# Patient Record
Sex: FEMALE | Race: WHITE | NOT HISPANIC OR LATINO | Employment: FULL TIME | URBAN - METROPOLITAN AREA
[De-identification: names, ages, dates, MRNs, and addresses within clinical notes are randomized per-mention and may not be internally consistent; named-entity substitution may affect disease eponyms.]

---

## 2017-01-02 PROBLEM — F32.0 MAJOR DEPRESSIVE DISORDER, SINGLE EPISODE, MILD (H): Status: ACTIVE | Noted: 2017-01-02

## 2017-02-28 ENCOUNTER — HOSPITAL ENCOUNTER (OUTPATIENT)
Dept: MAMMOGRAPHY | Facility: CLINIC | Age: 57
Discharge: HOME | End: 2017-02-28
Attending: INTERNAL MEDICINE | Admitting: INTERNAL MEDICINE
Payer: COMMERCIAL

## 2017-02-28 DIAGNOSIS — Z12.31 VISIT FOR SCREENING MAMMOGRAM: ICD-10-CM

## 2017-02-28 PROCEDURE — 77063 BREAST TOMOSYNTHESIS BI: CPT

## 2017-03-07 ENCOUNTER — MYC MEDICAL ADVICE (OUTPATIENT)
Dept: FAMILY MEDICINE | Facility: CLINIC | Age: 57
End: 2017-03-07

## 2017-03-07 NOTE — TELEPHONE ENCOUNTER
LM per christianhart and on patient mobile to call clinic for appt. Possibility to schedule for tomorrow or Thursday with pcp. Advised to ask for triage.    Lamar Herring RN

## 2017-03-09 ENCOUNTER — OFFICE VISIT (OUTPATIENT)
Dept: FAMILY MEDICINE | Facility: CLINIC | Age: 57
End: 2017-03-09
Payer: COMMERCIAL

## 2017-03-09 VITALS
SYSTOLIC BLOOD PRESSURE: 106 MMHG | HEIGHT: 67 IN | TEMPERATURE: 98 F | RESPIRATION RATE: 16 BRPM | WEIGHT: 194.2 LBS | BODY MASS INDEX: 30.48 KG/M2 | HEART RATE: 71 BPM | OXYGEN SATURATION: 97 % | DIASTOLIC BLOOD PRESSURE: 60 MMHG

## 2017-03-09 DIAGNOSIS — I45.6 ANOMALOUS ATRIOVENTRICULAR EXCITATION: ICD-10-CM

## 2017-03-09 DIAGNOSIS — F43.0 ACUTE REACTION TO STRESS: ICD-10-CM

## 2017-03-09 DIAGNOSIS — F33.1 MODERATE EPISODE OF RECURRENT MAJOR DEPRESSIVE DISORDER (H): Primary | ICD-10-CM

## 2017-03-09 PROCEDURE — 99214 OFFICE O/P EST MOD 30 MIN: CPT | Performed by: INTERNAL MEDICINE

## 2017-03-09 RX ORDER — CITALOPRAM HYDROBROMIDE 10 MG/1
10 TABLET ORAL DAILY
Qty: 30 TABLET | Refills: 3 | Status: SHIPPED | OUTPATIENT
Start: 2017-03-09 | End: 2018-05-10

## 2017-03-09 NOTE — NURSING NOTE
"Chief Complaint   Patient presents with     Depression       Initial /60 (BP Location: Right arm, Patient Position: Chair, Cuff Size: Adult Large)  Pulse 71  Temp 98  F (36.7  C) (Oral)  Resp 16  Ht 5' 7\" (1.702 m)  Wt 194 lb 3.2 oz (88.1 kg)  LMP 03/13/2013  SpO2 97%  BMI 30.42 kg/m2 Estimated body mass index is 30.42 kg/(m^2) as calculated from the following:    Height as of this encounter: 5' 7\" (1.702 m).    Weight as of this encounter: 194 lb 3.2 oz (88.1 kg).  Medication Reconciliation: complete   Sophie Mckeon MA      "

## 2017-03-09 NOTE — MR AVS SNAPSHOT
After Visit Summary   3/9/2017    Ade Crowley    MRN: [id number]           Patient Information     Date Of Birth          1960        Visit Information        Provider Department      3/9/2017 3:00 PM Verona Johnson MD [facility]        Today's Diagnoses     Acute reaction to stress    -  1       Follow-ups after your visit        Follow-up notes from your care team     Return in about 2 months (around 5/9/2017).      Your next 10 appointments already scheduled     Mar 22, 2017  3:30 PM CDT   SHORT with Verona Johnson MD   [facility]    [address] [address] [phone]              Who to contact     If you have questions or need follow up information about today's clinic visit or your schedule please contact [facility] directly at [phone].  Normal or non-critical lab and imaging results will be communicated to you by MyChart, letter or phone within 4 business days after the clinic has received the results. If you do not hear from us within 7 days, please contact the clinic through [email or web address] or phone. If you have a critical or abnormal lab result, we will notify you by phone as soon as possible.  Submit refill requests through [employer] or call your pharmacy and they will forward the refill request to us. Please allow 3 business days for your refill to be completed.          Additional Information About Your Visit        MyChart Information     [employer] gives you secure access to your electronic health record. If you see a primary care provider, you can also send messages to your care team and make appointments. If you have questions, please call your primary care clinic.  If you do not have a primary care provider, please call [phone] and they will assist you.        Care EveryWhere ID     This is your Care EveryWhere ID. This could be used by other organizations to  "access your [place] medical records  [id number]        Your Vitals Were     Pulse Temperature Respirations Height Last Period Pulse Oximetry    71 98  F (36.7  C) (Oral) 16 5' 7\" (1.702 m) 03/13/2013 97%    BMI (Body Mass Index)                   30.42 kg/m2            Blood Pressure from Last 3 Encounters:   03/09/17 106/60   12/21/16 104/62   07/27/16 112/70    Weight from Last 3 Encounters:   03/09/17 194 lb 3.2 oz (88.1 kg)   12/21/16 194 lb (88 kg)   07/27/16 192 lb 8 oz (87.3 kg)              Today, you had the following     No orders found for display         Today's Medication Changes          These changes are accurate as of: 3/9/17  3:24 PM.  If you have any questions, ask your nurse or doctor.               Start taking these medicines.        Dose/Directions    citalopram 10 MG tablet   Commonly known as:  celeXA   Used for:  Acute reaction to stress   Started by:  Verona Johnson MD        Dose:  10 mg   Take 1 tablet (10 mg) by mouth daily   Quantity:  30 tablet   Refills:  3            Where to get your medicines      These medications were sent to [facility] [address] [place], [place] - [email or web address]  [address] [facility] [address]     Phone:  [phone]     citalopram 10 MG tablet                Primary Care Provider Office Phone # Fax #    Verona Johnson [phone]       [facility] [address]  [facility] access hospital [address]        Thank you!     Thank you for choosing [facility]  for your care. Our goal is always to provide you with excellent care. Hearing back from our patients is one way we can continue to improve our services. Please take a few minutes to complete the written survey that you may receive in the mail after your visit with us. Thank you!             Your Updated Medication List - Protect others around you: Learn how to safely use, store and throw away your medicines at www.disposemymeds.org.          This " list is accurate as of: 3/9/17  3:24 PM.  Always use your most recent med list.                   Brand Name Dispense Instructions for use    buPROPion 300 MG 24 hr tablet    WELLBUTRIN XL    90 tablet    Take 1 tablet (300 mg) by mouth every morning       citalopram 10 MG tablet    celeXA    30 tablet    Take 1 tablet (10 mg) by mouth daily

## 2017-03-09 NOTE — PROGRESS NOTES
"  SUBJECTIVE:                                                    Ade Crowley is a 56 year old female who presents to clinic today for the following health issues:    Depression Follow-up      Status since last visit: Worsened, pt is feeling very tense, and anxious    See PHQ-9 for current symptoms. Other associated symptoms: None    Complicating factors:   Significant life event: No   Current substance abuse: None  Anxiety or Panic symptoms: Pt has been feeling a lot more anxious lately.   PHQ-9  English PHQ-9   Any Language          Amount of exercise or physical activity: Daily activities, including work     Problems taking medications regularly: No    Medication side effects: none, but pt states that she may need a dose adjustment.     Diet: regular (no restrictions)    Problem list and histories reviewed & adjusted, as indicated.  Additional history: as documented    BP Readings from Last 3 Encounters:   03/09/17 106/60   12/21/16 104/62   07/27/16 112/70    Wt Readings from Last 3 Encounters:   03/09/17 194 lb 3.2 oz (88.1 kg)   12/21/16 194 lb (88 kg)   07/27/16 192 lb 8 oz (87.3 kg)         Reviewed and updated as needed this visit by clinical staff  Tobacco  Allergies  Meds  Problems  Med Hx  Surg Hx  Fam Hx  Soc Hx      Reviewed and updated as needed this visit by Provider  Allergies  Meds  Problems       ROS:  CONSTITUTIONAL: NEGATIVE for fever, chills, change in weight  RESP: NEGATIVE for significant cough or SOB  CV: hx of sheila Parkinson White arrhythmia; NEGATIVE for chest pain, palpitations or peripheral edema  GI: NEGATIVE for nausea, abdominal pain, heartburn, or change in bowel habits  MUSCULOSKELETAL: NEGATIVE for significant arthralgias or myalgia  : no urinary symptoms;   GYN: no hot flashes  NEURO: NEGATIVE for weakness, dizziness or paresthesias  PSYCHIATRIC: Hx of depression - use of Wellbutrin, POSITIVE for increased anxiety - feeling \"tense\" (tense muscles, headaches), " "difficulties handling stress (family, recent \"empty cliff,\" daughter with health problems), difficulties sleeping    This document serves as a record of the services and decisions personally performed and made by Verona Johnson MD. It was created on her behalf by Yadira Parra, a trained medical scribe. The creation of this document is based on the provider's statements to the medical scribe.   Yadira Parra, 3:12 PM, March 9, 2017    OBJECTIVE:                                                    /60 (BP Location: Right arm, Patient Position: Chair, Cuff Size: Adult Large)  Pulse 71  Temp 98  F (36.7  C) (Oral)  Resp 16  Ht 5' 7\" (1.702 m)  Wt 194 lb 3.2 oz (88.1 kg)  LMP 03/13/2013  SpO2 97%  BMI 30.42 kg/m2  Body mass index is 30.42 kg/(m^2).  GENERAL APPEARANCE: healthy, alert and no distress  NECK: no bruits  RESP: lungs clear to auscultation - no rales, rhonchi or wheezes  CV: regular rates and rhythm and normal S1 S2, no peripheral edema  MS: extremities normal- no gross deformities noted  PSYCH: mentation appears normal and affect normal/bright    Diagnostic Test Results:  none      ASSESSMENT/PLAN:                                                    (F43.0) Acute reaction to stress (primary encounter diagnosis)  Comment: increased anxiety and family stressors, difficulties sleeping; reviewed addition of SSRI - continue Wellbutrin, add Citalopram and add counseling; [facility] information provided; reassess in 6-8 weeks   Plan: citalopram (CELEXA) 10 MG tablet; continue Wellbutrin, no change in med/dose    (F33.1) Moderate episode of recurrent major depressive disorder (H)  (primary encounter diagnosis)  Comment: add Citalopram to Wellbutrin; consider counseling; information provided.  Plan: counseling options; meds reviewed; as listed          (I45.6) Pt reports Del Cid Parkinson White  Comment: past arrythmia;   Plan: no active     MEDICATIONS:   Orders Placed This Encounter   Medications     " citalopram (CELEXA) 10 MG tablet     Sig: Take 1 tablet (10 mg) by mouth daily     Dispense:  30 tablet     Refill:  3     There are no discontinued medications.     FUTURE APPOINTMENTS:       - Follow-up visit in 6-8 weeks, reassess meds, counseling, etc.    Verona Johnson MD  Internal Medicine  [facility]    The information in this document, created by a medical scribe for me, accurately reflects the services I personally performed and the decisions made by me. I have reviewed and approved this document for accuracy.  Dr. Verona Johnson, 3:25 PM, March 9, 2017

## 2017-03-15 ASSESSMENT — ANXIETY QUESTIONNAIRES
3. WORRYING TOO MUCH ABOUT DIFFERENT THINGS: NEARLY EVERY DAY
1. FEELING NERVOUS, ANXIOUS, OR ON EDGE: NEARLY EVERY DAY
7. FEELING AFRAID AS IF SOMETHING AWFUL MIGHT HAPPEN: NEARLY EVERY DAY
5. BEING SO RESTLESS THAT IT IS HARD TO SIT STILL: MORE THAN HALF THE DAYS
6. BECOMING EASILY ANNOYED OR IRRITABLE: NEARLY EVERY DAY
GAD7 TOTAL SCORE: 20
2. NOT BEING ABLE TO STOP OR CONTROL WORRYING: NEARLY EVERY DAY
IF YOU CHECKED OFF ANY PROBLEMS ON THIS QUESTIONNAIRE, HOW DIFFICULT HAVE THESE PROBLEMS MADE IT FOR YOU TO DO YOUR WORK, TAKE CARE OF THINGS AT HOME, OR GET ALONG WITH OTHER PEOPLE: VERY DIFFICULT

## 2017-03-15 ASSESSMENT — PATIENT HEALTH QUESTIONNAIRE - PHQ9: 5. POOR APPETITE OR OVEREATING: NEARLY EVERY DAY

## 2017-03-16 ENCOUNTER — TRANSFERRED RECORDS (OUTPATIENT)
Dept: HEALTH INFORMATION MANAGEMENT | Facility: CLINIC | Age: 57
End: 2017-03-16

## 2017-03-16 ASSESSMENT — PATIENT HEALTH QUESTIONNAIRE - PHQ9: SUM OF ALL RESPONSES TO PHQ QUESTIONS 1-9: 20

## 2017-03-16 ASSESSMENT — ANXIETY QUESTIONNAIRES: GAD7 TOTAL SCORE: 20

## 2017-06-30 ENCOUNTER — OFFICE VISIT (OUTPATIENT)
Dept: CARDIOLOGY | Facility: CLINIC | Age: 57
End: 2017-06-30
Payer: COMMERCIAL

## 2017-06-30 VITALS
HEIGHT: 67 IN | SYSTOLIC BLOOD PRESSURE: 122 MMHG | WEIGHT: 194 LBS | HEART RATE: 66 BPM | DIASTOLIC BLOOD PRESSURE: 80 MMHG | BODY MASS INDEX: 30.45 KG/M2

## 2017-06-30 DIAGNOSIS — I47.10 SVT (SUPRAVENTRICULAR TACHYCARDIA) (H): Primary | ICD-10-CM

## 2017-06-30 PROCEDURE — 99213 OFFICE O/P EST LOW 20 MIN: CPT | Mod: 25 | Performed by: INTERNAL MEDICINE

## 2017-06-30 PROCEDURE — 93000 ELECTROCARDIOGRAM COMPLETE: CPT | Performed by: INTERNAL MEDICINE

## 2017-06-30 NOTE — PROGRESS NOTES
"Electrophysiology/ Clinic Note         H&P and Plan:     Ms. Crowley is a delightful 56-year-old lady with a long history of WPW syndrome and recurrent episodes of palpitations who was admitted in the hospital with an episode of syncope in 2015 and underwent EP study and accessory pathway ablation in 12/2015.  She is here today for evaluation of palpitations.         She underwent EP study in 2015, and we found 2 accessory pathways.  One located at the posterolateral wall at the LV and another one in posteroseptal/inferior wall of the LV.  The first pathway had both antegrade and retrograde conduction, while the second one only had retrograde conduction.  The pathway with antegrade conduction was able to conduct antegrade up to 220 milliseconds.  She underwent successful ablation.     In 04/2016 she presented with palpitation associated with frequent PVCs (PVCs had positive QRS morphology in the inferior leads).   Monitor confirmed a 4% PVC burden and she decided to continue conservative approach.    At the moment, she is doing well and rarely has any sensation of PVCs. She denies any symptoms such as chest pain, shortness of breath, palpitation, lightheadedness, near syncope or syncope.     ECG today shows NSR, no sings of preexcitation or PVC      ASSESSMENT AND PLAN:    1. palpitation related to PVCs. Resolved.  She is not on any medication.  She will f/u with us on PRN basis.  2.  Paroxysmal SVT/WPW syndrome.  She responded well to ablation.  EKG continues to show no signs of preexcitation.    Physical Exam:  Vitals: /80  Ht 1.702 m (5' 7\")  Wt 88 kg (194 lb)  LMP 03/13/2013  BMI 30.38 kg/m2    Constitutional:  AAO x3.  Pt is in NAD.  HEAD: normocephalic.  SKIN: Skin normal color, texture and turgor with no lesions or eruptions.  Eyes: PERRL, EOMI.  ENT:  Supple, normal JVP. No lymphadenopathy or thyroid enlargement.  Chest:  CTAB.  Cardiac: RRR, normal  S1 and S2.  No murmurs rubs or gallop.   Abdomen: "  Normal BS.  Soft, non-tender and non-distended.  No rebound or guarding.    Extremities:  Pedious pulses palpable B/L.  No LE edema noticed.   Neurological: Strength and sensation grossly symmetric and intact throughout.       CURRENT MEDICATIONS:  Current Outpatient Prescriptions   Medication Sig Dispense Refill     buPROPion (WELLBUTRIN XL) 300 MG 24 hr tablet Take 1 tablet (300 mg) by mouth every morning 90 tablet 3     citalopram (CELEXA) 10 MG tablet Take 1 tablet (10 mg) by mouth daily (Patient not taking: Reported on 2017) 30 tablet 3       ALLERGIES     Allergies   Allergen Reactions     No Known Drug Allergies        PAST MEDICAL HISTORY:  Past Medical History:   Diagnosis Date     Anesthesia complication     pt states with one of her C-sections the epidural didn't work and she needed general anesthesia     Depression      Infrequent menses     & heavy menses     SVT (supraventricular tachycardia) (H)     S/p ablation 12/10/15     WPW (Jaret-Parkinson-White syndrome) 1997       PAST SURGICAL HISTORY:  Past Surgical History:   Procedure Laterality Date     APPENDECTOMY       C NONSPECIFIC PROCEDURE      Tubal ligation     C NONSPECIFIC PROCEDURE      C-sections x 3     C NONSPECIFIC PROCEDURE      left breast biopsy--benign      SECTION  x3     COLONOSCOPY N/A 2014    Procedure: COMBINED COLONOSCOPY, SINGLE OR MULTIPLE BIOPSY/POLYPECTOMY BY BIOPSY;  Surgeon: Jesus Adler MD;  Location:  GI     DAVINCI HYSTERECTOMY SUPRACERVICAL  2013    Procedure: DAVINCI HYSTERECTOMY SUPRACERVICAL;  Frozen section dilation & curretage. Robotic Assisted  Laparoscopic Supracervical Hysterectomy, Bilateral Salpingectomy, Cystoscopy ;  Surgeon: Sophia Jiménez DO;  Location: [facility] OR     DILATION AND CURETTAGE  2013    Procedure: DILATION AND CURETTAGE;;  Surgeon: Sophia Jiménez DO;  Location: [facility] OR     H ABLATION SVT  12/10/2015    WPW     HC REMOVAL OF TONSILS,12+ Y/O          FAMILY HISTORY:  Family History   Problem Relation Age of Onset     Arthritis Mother      CANCER Mother      breast cancer stage 4 original [id number] recurred 2 years ago/skin cancer bcc     Gynecology Mother      uterine tumor with hysterectomy     Thyroid Disease Mother      hypothyroid     Breast Cancer Mother      Cardiovascular Father      chf  of heart attack age 68     DIABETES Daughter      type one diabetes   diagnosed age 9     CANCER Maternal Grandmother      bone cancer       SOCIAL HISTORY:  Social History     Social History     Marital status:      Spouse name: N/A     Number of children: N/A     Years of education: N/A     Social History Main Topics     Smoking status: Former Smoker     Years: 20.00     Types: Cigarettes     Quit date: 2007     Smokeless tobacco: Never Used      Comment: 10/3/2015     Alcohol use 0.0 oz/week     0 Standard drinks or equivalent per week      Comment: occasional beer/wine, few drinks every other weekend     Drug use: No     Sexual activity: Yes     Partners: Male     Birth control/ protection: Surgical      Comment: tubal ligation     Other Topics Concern     Parent/Sibling W/ Cabg, Mi Or Angioplasty Before 65f 55m? No     Caffeine Concern Yes     4-5 cups coffee per day     Sleep Concern No     Weight Concern Yes     trying to lose weight     Special Diet Yes     weight watchers     Exercise Yes     gym 3 days week     Social History Narrative       Review of Systems:  Skin:  Negative     Eyes:  Positive for glasses  ENT:  Negative    Respiratory:  Negative    Cardiovascular:  Negative edema;Positive for  Gastroenterology: Negative    Genitourinary:  Negative    Musculoskeletal:  Positive for nocturnal cramping  Neurologic:  Negative    Psychiatric:  Negative anxiety  Heme/Lymph/Imm:  Negative    Endocrine:  Negative        Recent Lab Results:  LIPID RESULTS:  Lab Results   Component Value Date    CHOL 175 2016    HDL 42 (L)  08/19/2016     (H) 08/19/2016    TRIG 130 08/19/2016    CHOLHDLRATIO 3.4 06/03/2015       LIVER ENZYME RESULTS:  Lab Results   Component Value Date    AST 11 08/19/2016    ALT 19 08/19/2016       CBC RESULTS:  Lab Results   Component Value Date    WBC 4.8 08/19/2016    RBC 4.37 08/19/2016    HGB 13.4 08/19/2016    HCT 40.4 08/19/2016    MCV 92 08/19/2016    MCH 30.7 08/19/2016    MCHC 33.2 08/19/2016    RDW 13.0 08/19/2016     08/19/2016       BMP RESULTS:  Lab Results   Component Value Date     08/19/2016    POTASSIUM 4.0 08/19/2016    CHLORIDE 108 08/19/2016    CO2 27 08/19/2016    ANIONGAP 5 08/19/2016    GLC 99 08/19/2016    BUN 12 08/19/2016    CR 0.78 08/19/2016    GFRESTIMATED 77 08/19/2016    GFRESTBLACK >90   GFR Calc   08/19/2016    MILAN 8.9 08/19/2016        A1C RESULTS:  No results found for: A1C    INR RESULTS:  Lab Results   Component Value Date    INR 0.90 12/10/2015         ECHOCARDIOGRAM  No results found for this or any previous visit (from the past 8760 hour(s)).      Orders Placed This Encounter   Procedures     EKG 12-lead complete w/read - Clinics (performed today)     No orders of the defined types were placed in this encounter.    There are no discontinued medications.      Encounter Diagnosis   Name Primary?     SVT (supraventricular tachycardia) (H) Yes         CC  No referring provider defined for this encounter.

## 2017-06-30 NOTE — MR AVS SNAPSHOT
"              After Visit Summary   6/30/2017    Ade Crowley    MRN: [id number]           Patient Information     Date Of Birth          1960        Visit Information        Provider Department      6/30/2017 1:15 PM Alex Smith MD [facility] [place]        Today's Diagnoses     SVT (supraventricular tachycardia) (H)    -  1       Follow-ups after your visit        Who to contact     If you have questions or need follow up information about today's clinic visit or your schedule please contact [facility] directly at [phone].  Normal or non-critical lab and imaging results will be communicated to you by iMegahart, letter or phone within 4 business days after the clinic has received the results. If you do not hear from us within 7 days, please contact the clinic through [employer] or phone. If you have a critical or abnormal lab result, we will notify you by phone as soon as possible.  Submit refill requests through [employer] or call your pharmacy and they will forward the refill request to us. Please allow 3 business days for your refill to be completed.          Additional Information About Your Visit        MyChart Information     [employer] gives you secure access to your electronic health record. If you see a primary care provider, you can also send messages to your care team and make appointments. If you have questions, please call your primary care clinic.  If you do not have a primary care provider, please call [phone] and they will assist you.        Care EveryWhere ID     This is your Care EveryWhere ID. This could be used by other organizations to access your [place] medical records  [id number]        Your Vitals Were     Pulse Height Last Period BMI (Body Mass Index)          66 1.702 m (5' 7\") 03/13/2013 30.38 kg/m2         Blood Pressure from Last 3 Encounters:   06/30/17 122/80   03/09/17 106/60   12/21/16 " 104/62    Weight from Last 3 Encounters:   06/30/17 88 kg (194 lb)   03/09/17 88.1 kg (194 lb 3.2 oz)   12/21/16 88 kg (194 lb)              We Performed the Following     EKG 12-lead complete w/read - Clinics (performed today)        Primary Care Provider Office Phone # Fax #    Verona Raghu Johnson [phone]       [facility] [address] [facility] [address]        Equal Access to Services     EDITA ALVARADO : Hadii aad ku hadasho Soomaali, waaxda luqadaha, qaybta kaalmada adeegyada, waxay idiin hayaan adeeg kharash bisi . So [facility] [phone].    ATENCIÓN: Si habla español, tiene a orozco disposición servicios gratuitos de asistencia lingüística. Llame al [phone].    We comply with applicable federal civil rights laws and [place] laws. We do not discriminate on the basis of race, color, national origin, age, disability sex, sexual orientation or gender identity.            Thank you!     Thank you for choosing [facility] [place]  for your care. Our goal is always to provide you with excellent care. Hearing back from our patients is one way we can continue to improve our services. Please take a few minutes to complete the written survey that you may receive in the mail after your visit with us. Thank you!             Your Updated Medication List - Protect others around you: Learn how to safely use, store and throw away your medicines at www.disposemymeds.org.          This list is accurate as of: 6/30/17  1:36 PM.  Always use your most recent med list.                   Brand Name Dispense Instructions for use Diagnosis    buPROPion 300 MG 24 hr tablet    WELLBUTRIN XL    90 tablet    Take 1 tablet (300 mg) by mouth every morning    Major depressive disorder, single episode, mild (H)       citalopram 10 MG tablet    celeXA    30 tablet    Take 1 tablet (10 mg) by mouth daily    Acute reaction to stress

## 2017-06-30 NOTE — LETTER
"6/30/2017    Verona Johnson MD  [facility]   [address]  [facility] [address]    RE: Ade GEORGE Carline       Dear Colleague,    I had the pleasure of seeing Ade GEORGE Mademar in the [facility].    Electrophysiology/ Clinic Note         H&P and Plan:     Ms. Crowley is a delightful 56-year-old lady with a long history of WPW syndrome and recurrent episodes of palpitations who was admitted in the hospital with an episode of syncope in 2015 and underwent EP study and accessory pathway ablation in 12/2015.  She is here today for evaluation of palpitations.         She underwent EP study in 2015, and we found 2 accessory pathways.  One located at the posterolateral wall at the LV and another one in posteroseptal/inferior wall of the LV.  The first pathway had both antegrade and retrograde conduction, while the second one only had retrograde conduction.  The pathway with antegrade conduction was able to conduct antegrade up to 220 milliseconds.  She underwent successful ablation.     In 04/2016 she presented with palpitation associated with frequent PVCs (PVCs had positive QRS morphology in the inferior leads).   Monitor confirmed a 4% PVC burden and she decided to continue conservative approach.    At the moment, she is doing well and rarely has any sensation of PVCs. She denies any symptoms such as chest pain, shortness of breath, palpitation, lightheadedness, near syncope or syncope.     ECG today shows NSR, no sings of preexcitation or PVC      ASSESSMENT AND PLAN:    1. palpitation related to PVCs. Resolved.  She is not on any medication.  She will f/u with us on PRN basis.  2.  Paroxysmal SVT/WPW syndrome.  She responded well to ablation.  EKG continues to show no signs of preexcitation.    Physical Exam:  Vitals: /80  Ht 1.702 m (5' 7\")  Wt 88 kg (194 lb)  LMP 03/13/2013  BMI 30.38 kg/m2    Constitutional:  AAO x3.  Pt is in NAD.  HEAD: " normocephalic.  SKIN: Skin normal color, texture and turgor with no lesions or eruptions.  Eyes: PERRL, EOMI.  ENT:  Supple, normal JVP. No lymphadenopathy or thyroid enlargement.  Chest:  CTAB.  Cardiac: RRR, normal  S1 and S2.  No murmurs rubs or gallop.   Abdomen:  Normal BS.  Soft, non-tender and non-distended.  No rebound or guarding.    Extremities:  Pedious pulses palpable B/L.  No LE edema noticed.   Neurological: Strength and sensation grossly symmetric and intact throughout.       CURRENT MEDICATIONS:  Current Outpatient Prescriptions   Medication Sig Dispense Refill     buPROPion (WELLBUTRIN XL) 300 MG 24 hr tablet Take 1 tablet (300 mg) by mouth every morning 90 tablet 3     citalopram (CELEXA) 10 MG tablet Take 1 tablet (10 mg) by mouth daily (Patient not taking: Reported on 2017) 30 tablet 3       ALLERGIES     Allergies   Allergen Reactions     No Known Drug Allergies        PAST MEDICAL HISTORY:  Past Medical History:   Diagnosis Date     Anesthesia complication     pt states with one of her C-sections the epidural didn't work and she needed general anesthesia     Depression      Infrequent menses     & heavy menses     SVT (supraventricular tachycardia) (H)     S/p ablation 12/10/15     WPW (Jaret-Parkinson-White syndrome) 1997       PAST SURGICAL HISTORY:  Past Surgical History:   Procedure Laterality Date     APPENDECTOMY       C NONSPECIFIC PROCEDURE      Tubal ligation     C NONSPECIFIC PROCEDURE      C-sections x 3     C NONSPECIFIC PROCEDURE      left breast biopsy--benign      SECTION  x3     COLONOSCOPY N/A 2014    Procedure: COMBINED COLONOSCOPY, SINGLE OR MULTIPLE BIOPSY/POLYPECTOMY BY BIOPSY;  Surgeon: Jesus Adler MD;  Location:  GI     DAVINCI HYSTERECTOMY SUPRACERVICAL  2013    Procedure: DAVINCI HYSTERECTOMY SUPRACERVICAL;  Frozen section dilation & curretage. Robotic Assisted  Laparoscopic Supracervical Hysterectomy, Bilateral Salpingectomy,  Cystoscopy ;  Surgeon: Sophia Jiménez DO;  Location: [facility] OR     DILATION AND CURETTAGE  2013    Procedure: DILATION AND CURETTAGE;;  Surgeon: Sophia Jiménez DO;  Location: [facility] OR     H ABLATION SVT  12/10/2015    WPW     HC REMOVAL OF TONSILS,12+ Y/O         FAMILY HISTORY:  Family History   Problem Relation Age of Onset     Arthritis Mother      CANCER Mother      breast cancer stage 4 original [id number] recurred 2 years ago/skin cancer bcc     Gynecology Mother      uterine tumor with hysterectomy     Thyroid Disease Mother      hypothyroid     Breast Cancer Mother      Cardiovascular Father      chf  of heart attack age 68     DIABETES Daughter      type one diabetes   diagnosed age 9     CANCER Maternal Grandmother      bone cancer       SOCIAL HISTORY:  Social History     Social History     Marital status:      Spouse name: N/A     Number of children: N/A     Years of education: N/A     Social History Main Topics     Smoking status: Former Smoker     Years: 20.00     Types: Cigarettes     Quit date: 2007     Smokeless tobacco: Never Used      Comment: 10/3/2015     Alcohol use 0.0 oz/week     0 Standard drinks or equivalent per week      Comment: occasional beer/wine, few drinks every other weekend     Drug use: No     Sexual activity: Yes     Partners: Male     Birth control/ protection: Surgical      Comment: tubal ligation     Other Topics Concern     Parent/Sibling W/ Cabg, Mi Or Angioplasty Before 65f 55m? No     Caffeine Concern Yes     4-5 cups coffee per day     Sleep Concern No     Weight Concern Yes     trying to lose weight     Special Diet Yes     weight watchers     Exercise Yes     gym 3 days week     Social History Narrative       Review of Systems:  Skin:  Negative     Eyes:  Positive for glasses  ENT:  Negative    Respiratory:  Negative    Cardiovascular:  Negative edema;Positive for  Gastroenterology: Negative    Genitourinary:  Negative     Musculoskeletal:  Positive for nocturnal cramping  Neurologic:  Negative    Psychiatric:  Negative anxiety  Heme/Lymph/Imm:  Negative    Endocrine:  Negative        Recent Lab Results:  LIPID RESULTS:  Lab Results   Component Value Date    CHOL 175 08/19/2016    HDL 42 (L) 08/19/2016     (H) 08/19/2016    TRIG 130 08/19/2016    CHOLHDLRATIO 3.4 06/03/2015       LIVER ENZYME RESULTS:  Lab Results   Component Value Date    AST 11 08/19/2016    ALT 19 08/19/2016       CBC RESULTS:  Lab Results   Component Value Date    WBC 4.8 08/19/2016    RBC 4.37 08/19/2016    HGB 13.4 08/19/2016    HCT 40.4 08/19/2016    MCV 92 08/19/2016    MCH 30.7 08/19/2016    MCHC 33.2 08/19/2016    RDW 13.0 08/19/2016     08/19/2016       BMP RESULTS:  Lab Results   Component Value Date     08/19/2016    POTASSIUM 4.0 08/19/2016    CHLORIDE 108 08/19/2016    CO2 27 08/19/2016    ANIONGAP 5 08/19/2016    GLC 99 08/19/2016    BUN 12 08/19/2016    CR 0.78 08/19/2016    GFRESTIMATED 77 08/19/2016    GFRESTBLACK >90   GFR Calc   08/19/2016    MILAN 8.9 08/19/2016        A1C RESULTS:  No results found for: A1C    INR RESULTS:  Lab Results   Component Value Date    INR 0.90 12/10/2015         ECHOCARDIOGRAM  No results found for this or any previous visit (from the past 8760 hour(s)).      Orders Placed This Encounter   Procedures     EKG 12-lead complete w/read - Clinics (performed today)     No orders of the defined types were placed in this encounter.    There are no discontinued medications.      Encounter Diagnosis   Name Primary?     SVT (supraventricular tachycardia) (H) Yes     Thank you for allowing me to participate in the care of your patient.    Sincerely,     Alex Smith MD     [facility]

## 2017-11-18 DIAGNOSIS — F32.0 MAJOR DEPRESSIVE DISORDER, SINGLE EPISODE, MILD (H): ICD-10-CM

## 2017-11-22 ASSESSMENT — PATIENT HEALTH QUESTIONNAIRE - PHQ9
SUM OF ALL RESPONSES TO PHQ QUESTIONS 1-9: 8
5. POOR APPETITE OR OVEREATING: NOT AT ALL

## 2017-11-22 ASSESSMENT — ANXIETY QUESTIONNAIRES
IF YOU CHECKED OFF ANY PROBLEMS ON THIS QUESTIONNAIRE, HOW DIFFICULT HAVE THESE PROBLEMS MADE IT FOR YOU TO DO YOUR WORK, TAKE CARE OF THINGS AT HOME, OR GET ALONG WITH OTHER PEOPLE: NOT DIFFICULT AT ALL
2. NOT BEING ABLE TO STOP OR CONTROL WORRYING: SEVERAL DAYS
6. BECOMING EASILY ANNOYED OR IRRITABLE: SEVERAL DAYS
3. WORRYING TOO MUCH ABOUT DIFFERENT THINGS: NOT AT ALL
5. BEING SO RESTLESS THAT IT IS HARD TO SIT STILL: NOT AT ALL
7. FEELING AFRAID AS IF SOMETHING AWFUL MIGHT HAPPEN: SEVERAL DAYS
1. FEELING NERVOUS, ANXIOUS, OR ON EDGE: SEVERAL DAYS
GAD7 TOTAL SCORE: 4

## 2017-11-22 NOTE — TELEPHONE ENCOUNTER
Requested Prescriptions   Pending Prescriptions Disp Refills     buPROPion (WELLBUTRIN XL) 300 MG 24 hr tablet 90 tablet 3     Sig: Take 1 tablet (300 mg) by mouth every morning    SSRIs Protocol Failed    11/20/2017  2:32 PM       Failed - PHQ-9 score less than 5 in past 6 months    Please review PHQ-9 score.          Failed - Medication is NOT Bupropion    If the medication is Bupropion (Wellbutrin), and the patient is taking for smoking cessation; OK to refill.         Failed - Recent (6 mo) or future visit with authorizing provider's specialty    Patient had office visit in the last 6 months or has a visit in the next 30 days with authorizing provider.  See chart review.          Passed - Patient is age 18 or older       Passed - No active pregnancy on record       Passed - No positive pregnancy test in last 12 months        Scores are updated today, please review scores.  She has medications, so will not need today.  PHQ 9 is >4. Please sign if ok.  Snow Durand RN  Triage Nurse

## 2017-11-23 ASSESSMENT — ANXIETY QUESTIONNAIRES: GAD7 TOTAL SCORE: 4

## 2017-11-28 RX ORDER — BUPROPION HYDROCHLORIDE 300 MG/1
300 TABLET ORAL EVERY MORNING
Qty: 90 TABLET | Refills: 0 | Status: SHIPPED | OUTPATIENT
Start: 2017-11-28 | End: 2018-04-24

## 2017-11-28 NOTE — TELEPHONE ENCOUNTER
PAMELA-7 SCORE 4/21/2016 3/15/2017 11/22/2017   Total Score - - -   Total Score - - -   Total Score 3 20 4       PHQ-9 SCORE 11/24/2016 3/15/2017 11/22/2017   Total Score - - -   Total Score MyChart 3 (Minimal depression) - -   Total Score - 20 8     Due for follow up to assess meds and additional treatment options.will provide limited supply

## 2018-04-24 ENCOUNTER — TELEPHONE (OUTPATIENT)
Dept: FAMILY MEDICINE | Facility: CLINIC | Age: 58
End: 2018-04-24

## 2018-04-24 DIAGNOSIS — F32.0 MAJOR DEPRESSIVE DISORDER, SINGLE EPISODE, MILD (H): ICD-10-CM

## 2018-04-25 NOTE — TELEPHONE ENCOUNTER
"Requested Prescriptions   Pending Prescriptions Disp Refills     buPROPion (WELLBUTRIN XL) 300 MG 24 hr tablet [Pharmacy Med Name: BUPROPION XL 300MG TAB]    Last Written Prescription Date:  11/28/2017  Last Fill Quantity: 90,  # refills: 0   Last office visit: 3/9/2017 with prescribing provider:  Verona Johnson     Future Office Visit:     90 tablet 0     Sig: TAKE ONE TABLET BY MOUTH EVERY MORNING    SSRIs Protocol Failed    4/24/2018  3:40 PM       Failed - PHQ-9 score less than 5 in past 6 months    Please review last PHQ-9 score.     PHQ-9 SCORE 11/24/2016 3/15/2017 11/22/2017   Total Score - - -   Total Score MyChart 3 (Minimal depression) - -   Total Score - 20 8     PAMELA-7 SCORE 4/21/2016 3/15/2017 11/22/2017   Total Score - - -   Total Score - - -   Total Score 3 20 4                Failed - Recent (6 mo) or future (30 days) visit within the authorizing provider's specialty    Patient had office visit in the last 6 months or has a visit in the next 30 days with authorizing provider or within the authorizing provider's specialty.  See \"Patient Info\" tab in inbasket, or \"Choose Columns\" in Meds & Orders section of the refill encounter.           Passed - Medication is Bupropion    If the medication is Bupropion (Wellbutrin), and the patient is taking for smoking cessation; OK to refill.         Passed - Patient is age 18 or older       Passed - No active pregnancy on record       Passed - No positive pregnancy test in last 12 months          "

## 2018-04-27 NOTE — TELEPHONE ENCOUNTER
Routing refill request to provider for review/approval because:  Elevated PHQ9, has not been seen by provider in over a year    Josefa Fermin RN  [facility]

## 2018-04-28 ENCOUNTER — HEALTH MAINTENANCE LETTER (OUTPATIENT)
Age: 58
End: 2018-04-28

## 2018-05-04 RX ORDER — BUPROPION HYDROCHLORIDE 300 MG/1
TABLET ORAL
Qty: 90 TABLET | Refills: 0 | Status: SHIPPED | OUTPATIENT
Start: 2018-05-04 | End: 2018-05-10

## 2018-05-04 NOTE — TELEPHONE ENCOUNTER
RN received refill response from provider, cannot recall which clinic ([facility] or [facility]) this provider is associated with and no longer have the list of teams from the day our clinic was assisting with refills.    Routed to jose juan team that populates in my search, assume they can route appropriately to advise patient.  Josefa Fermin RN  [facility]

## 2018-05-04 NOTE — TELEPHONE ENCOUNTER
Pt has upcoming appt on 5/10/2018 with PCP. pt was due in March. Will provide #30 for local fill.   inadvertently sent for 90 ; pharmacy contacted and changed to #30.    Note to triage. When pt has NOT been in for over a year, cannot/will not authorize #90  but will authorize #30 when pt has a follow up appt.    Thanks.  Verona Johnson MD  Internal Medicine  electronically signed

## 2018-05-10 ENCOUNTER — OFFICE VISIT (OUTPATIENT)
Dept: FAMILY MEDICINE | Facility: CLINIC | Age: 58
End: 2018-05-10
Payer: COMMERCIAL

## 2018-05-10 VITALS
SYSTOLIC BLOOD PRESSURE: 124 MMHG | WEIGHT: 203 LBS | BODY MASS INDEX: 31.86 KG/M2 | RESPIRATION RATE: 15 BRPM | HEIGHT: 67 IN | TEMPERATURE: 98 F | OXYGEN SATURATION: 99 % | DIASTOLIC BLOOD PRESSURE: 68 MMHG | HEART RATE: 67 BPM

## 2018-05-10 DIAGNOSIS — F43.0 ACUTE REACTION TO STRESS: ICD-10-CM

## 2018-05-10 DIAGNOSIS — F32.0 MAJOR DEPRESSIVE DISORDER, SINGLE EPISODE, MILD (H): Primary | ICD-10-CM

## 2018-05-10 DIAGNOSIS — Z23 NEED FOR TDAP VACCINATION: ICD-10-CM

## 2018-05-10 DIAGNOSIS — Z12.31 VISIT FOR SCREENING MAMMOGRAM: ICD-10-CM

## 2018-05-10 PROCEDURE — 90471 IMMUNIZATION ADMIN: CPT | Performed by: INTERNAL MEDICINE

## 2018-05-10 PROCEDURE — 90715 TDAP VACCINE 7 YRS/> IM: CPT | Performed by: INTERNAL MEDICINE

## 2018-05-10 PROCEDURE — 99214 OFFICE O/P EST MOD 30 MIN: CPT | Mod: 25 | Performed by: INTERNAL MEDICINE

## 2018-05-10 RX ORDER — BUPROPION HYDROCHLORIDE 300 MG/1
300 TABLET ORAL EVERY MORNING
Qty: 90 TABLET | Refills: 3 | Status: SHIPPED | OUTPATIENT
Start: 2018-05-10 | End: 2019-04-29

## 2018-05-10 RX ORDER — CITALOPRAM HYDROBROMIDE 10 MG/1
10 TABLET ORAL DAILY
Qty: 30 TABLET | Refills: 6 | Status: SHIPPED | OUTPATIENT
Start: 2018-05-10 | End: 2018-06-25

## 2018-05-10 ASSESSMENT — ANXIETY QUESTIONNAIRES
3. WORRYING TOO MUCH ABOUT DIFFERENT THINGS: SEVERAL DAYS
7. FEELING AFRAID AS IF SOMETHING AWFUL MIGHT HAPPEN: SEVERAL DAYS
5. BEING SO RESTLESS THAT IT IS HARD TO SIT STILL: NOT AT ALL
GAD7 TOTAL SCORE: 3
IF YOU CHECKED OFF ANY PROBLEMS ON THIS QUESTIONNAIRE, HOW DIFFICULT HAVE THESE PROBLEMS MADE IT FOR YOU TO DO YOUR WORK, TAKE CARE OF THINGS AT HOME, OR GET ALONG WITH OTHER PEOPLE: SOMEWHAT DIFFICULT
1. FEELING NERVOUS, ANXIOUS, OR ON EDGE: NOT AT ALL
6. BECOMING EASILY ANNOYED OR IRRITABLE: SEVERAL DAYS
2. NOT BEING ABLE TO STOP OR CONTROL WORRYING: NOT AT ALL

## 2018-05-10 ASSESSMENT — PATIENT HEALTH QUESTIONNAIRE - PHQ9: 5. POOR APPETITE OR OVEREATING: NOT AT ALL

## 2018-05-10 NOTE — MR AVS SNAPSHOT
After Visit Summary   5/10/2018    Ade Crowley    MRN: [id number]           Patient Information     Date Of Birth          1960        Visit Information        Provider Department      5/10/2018 3:30 PM Verona Johnson MD [facility]        Today's Diagnoses     Major depressive disorder, single episode, mild (H)    -  1    Acute reaction to stress        Visit for screening mammogram        Need for Tdap vaccination          Care Instructions    YOU MAY CONTACT [facility] [phone] TO SCHEDULE YOUR MAMMOGRAM AT YOUR CONVENIENCE.       - Can use compression socks in the evening to help with swelling.   -           Follow-ups after your visit        Your next 10 appointments already scheduled     Jun 19, 2018  8:30 AM CDT   [facility]    [address]  [facility] [address] [phone]           Bring a current list of meds and any records pertaining to this visit. For Physicals, please bring immunization records and any forms needing to be filled out. Please arrive 10 minutes early to complete paperwork.              Future tests that were ordered for you today     Open Future Orders        Priority Expected Expires Ordered    MA Screening Digital Bilateral Routine  5/10/2019 5/10/2018            Who to contact     If you have questions or need follow up information about today's clinic visit or your schedule please contact [facility] directly at [phone].  Normal or non-critical lab and imaging results will be communicated to you by MyChart, letter or phone within 4 business days after the clinic has received the results. If you do not hear from us within 7 days, please contact the clinic through MyChart or phone. If you have a critical or abnormal lab result, we will notify you by phone as soon as possible.  Submit refill requests  "through [employer] or call your pharmacy and they will forward the refill request to us. Please allow 3 business days for your refill to be completed.          Additional Information About Your Visit        [employer] Information     [employer] gives you secure access to your electronic health record. If you see a primary care provider, you can also send messages to your care team and make appointments. If you have questions, please call your primary care clinic.  If you do not have a primary care provider, please call [phone] and they will assist you.        Care EveryWhere ID     This is your Care EveryWhere ID. This could be used by other organizations to access your [place] medical records  [id number]        Your Vitals Were     Pulse Temperature Respirations Height Last Period Pulse Oximetry    67 98  F (36.7  C) (Oral) 15 5' 7\" (1.702 m) 03/23/2013 99%    BMI (Body Mass Index)                   31.79 kg/m2            Blood Pressure from Last 3 Encounters:   05/10/18 124/68   06/30/17 122/80   03/09/17 106/60    Weight from Last 3 Encounters:   05/10/18 203 lb (92.1 kg)   06/30/17 194 lb (88 kg)   03/09/17 194 lb 3.2 oz (88.1 kg)              We Performed the Following     DEPRESSION ACTION PLAN (DAP)     IMMUNIZATION ADMIN, FIRST     TDAP VACCINE (ADACEL)          Today's Medication Changes          These changes are accurate as of 5/10/18  4:29 PM.  If you have any questions, ask your nurse or doctor.               These medicines have changed or have updated prescriptions.        Dose/Directions    buPROPion 300 MG 24 hr tablet   Commonly known as:  WELLBUTRIN XL   This may have changed:  See the new instructions.   Used for:  Major depressive disorder, single episode, mild (H)   Changed by:  Verona Johnson MD        Dose:  300 mg   Take 1 tablet (300 mg) by mouth every morning   Quantity:  90 tablet   Refills:  3            Where to get your medicines      These medications were sent to [facility] [address]  [place], [place] - [address]  [address] [place] [address]     Phone:  [phone]     buPROPion 300 MG 24 hr tablet    citalopram 10 MG tablet                Primary Care Provider Office Phone # Fax #    Verona Johnson [phone]       [address]  [facility] [address]        Equal Access to Services     [facility]: Hadii aad ku hadasho Soomaali, waaxda luqadaha, qaybta kaalmada adeegyada, waxay idiin hayaan adeeg kharash la'aan ah. So [facility] [phone].    ATENCIÓN: Si habla español, tiene a orozco disposición servicios gratuitos de asistencia lingüística. [facility] [phone].    We comply with applicable federal civil rights laws and [place] laws. We do not discriminate on the basis of race, color, national origin, age, disability, sex, sexual orientation, or gender identity.            Thank you!     Thank you for choosing [facility]  for your care. Our goal is always to provide you with excellent care. Hearing back from our patients is one way we can continue to improve our services. Please take a few minutes to complete the written survey that you may receive in the mail after your visit with us. Thank you!             Your Updated Medication List - Protect others around you: Learn how to safely use, store and throw away your medicines at www.disposemymeds.org.          This list is accurate as of 5/10/18  4:29 PM.  Always use your most recent med list.                   Brand Name Dispense Instructions for use Diagnosis    buPROPion 300 MG 24 hr tablet    WELLBUTRIN XL    90 tablet    Take 1 tablet (300 mg) by mouth every morning    Major depressive disorder, single episode, mild (H)       citalopram 10 MG tablet    celeXA    30 tablet    Take 1 tablet (10 mg) by mouth daily    Acute reaction to stress

## 2018-05-10 NOTE — LETTER
My Depression Action Plan  Name: Ade Crowley   Date of Birth 1960  Date: 5/10/2018    My doctor: Verona Johnson   My clinic: [facility]  [address] [address] [address] [phone]          GREEN    ZONE   Good Control    What it looks like:     Things are going generally well. You have normal up s and down s. You may even feel depressed from time to time, but bad moods usually last less than a day.   What you need to do:  1. Continue to care for yourself (see self care plan)  2. Check your depression survival kit and update it as needed  3. Follow your physician s recommendations including any medication.  4. Do not stop taking medication unless you consult with your physician first.           YELLOW         ZONE Getting Worse    What it looks like:     Depression is starting to interfere with your life.     It may be hard to get out of bed; you may be starting to isolate yourself from others.    Symptoms of depression are starting to last most all day and this has happened for several days.     You may have suicidal thoughts but they are not constant.   What you need to do:     1. Call your care team, your response to treatment will improve if you keep your care team informed of your progress. Yellow periods are signs an adjustment may need to be made.     2. Continue your self-care, even if you have to fake it!    3. Talk to someone in your support network    4. Open up your depression survival kit           RED    ZONE Medical Alert - Get Help    What it looks like:     Depression is seriously interfering with your life.     You may experience these or other symptoms: You can t get out of bed most days, can t work or engage in other necessary activities, you have trouble taking care of basic hygiene, or basic responsibilities, thoughts of suicide or death that will not go away, self-injurious behavior.     What you need to do:  1. Call your care team  and request a same-day appointment. If they are not available (weekends or after hours) call your local crisis line, emergency room or 911.            Depression Self Care Plan / Survival Kit    Self-Care for Depression  Here s the deal. Your body and mind are really not as separate as most people think.  What you do and think affects how you feel and how you feel influences what you do and think. This means if you do things that people who feel good do, it will help you feel better.  Sometimes this is all it takes.  There is also a place for medication and therapy depending on how severe your depression is, so be sure to consult with your medical provider and/ or Behavioral Health Consultant if your symptoms are worsening or not improving.     In order to better manage my stress, I will:    Exercise  Get some form of exercise, every day. This will help reduce pain and release endorphins, the  feel good  chemicals in your brain. This is almost as good as taking antidepressants!  This is not the same as joining a gym and then never going! (they count on that by the way ) It can be as simple as just going for a walk or doing some gardening, anything that will get you moving.      Hygiene   Maintain good hygiene (Get out of bed in the morning, Make your bed, Brush your teeth, Take a shower, and Get dressed like you were going to work, even if you are unemployed).  If your clothes don't fit try to get ones that do.    Diet  I will strive to eat foods that are good for me, drink plenty of water, and avoid excessive sugar, caffeine, alcohol, and other mood-altering substances.  Some foods that are helpful in depression are: complex carbohydrates, B vitamins, flaxseed, fish or fish oil, fresh fruits and vegetables.    Psychotherapy  I agree to participate in Individual Therapy (if recommended).    Medication  If prescribed medications, I agree to take them.  Missing doses can result in serious side effects.  I understand  that drinking alcohol, or other illicit drug use, may cause potential side effects.  I will not stop my medication abruptly without first discussing it with my provider.    Staying Connected With Others  I will stay in touch with my friends, family members, and my primary care provider/team.    Use your imagination  Be creative.  We all have a creative side; it doesn t matter if it s oil painting, sand castles, or mud pies! This will also kick up the endorphins.    Witness Beauty  (AKA stop and smell the roses) Take a look outside, even in mid-winter. Notice colors, textures. Watch the squirrels and birds.     Service to others  Be of service to others.  There is always someone else in need.  By helping others we can  get out of ourselves  and remember the really important things.  This also provides opportunities for practicing all the other parts of the program.    Humor  Laugh and be silly!  Adjust your TV habits for less news and crime-drama and more comedy.    Control your stress  Try breathing deep, massage therapy, biofeedback, and meditation. Find time to relax each day.     My support system    Clinic Contact:  Phone number:    Contact 1:  Phone number:    Contact 2:  Phone number:    [facility]/:  Phone number:    Therapist:  Phone number:    Local crisis center:    Phone number:    Other community support:  Phone number:

## 2018-05-10 NOTE — PATIENT INSTRUCTIONS
YOU MAY CONTACT [facility] [phone] TO SCHEDULE YOUR MAMMOGRAM AT YOUR CONVENIENCE.       - Can use compression socks in the evening to help with swelling.   -

## 2018-05-10 NOTE — PROGRESS NOTES
SUBJECTIVE:   Ade Crowley is a 57 year old female who presents to clinic today for the following health issues:    Depression Follow up  Unsure if Wellbutrin is working well. It was a rough winter and so she is having trouble determining wether her depression is chemical or environmental. Interested in starting her Celexa back up but is worried about weight gain.     Status since last visit: Stable but noted increased symptoms over the winter and early spring     See PHQ-9 for current symptoms.  Other associated symptoms: None    Complicating factors:   Significant life event:  No   Current substance abuse:  None  Anxiety or Panic symptoms:  Yes-  anxiety    PHQ-9 3/15/2017 11/22/2017 5/10/2018   Total Score 20 8 9   Q9: Suicide Ideation Not at all Not at all Not at all       PHQ-9  English  PHQ-9   Any Language  Suicide Assessment Five-step Evaluation and Treatment (SAFE-T)    Amount of exercise or physical activity: 2-3 days/week for an average of 45-60 minutes    Problems taking medications regularly: No    Medication side effects: none    Diet: regular (no restrictions)    For the past 2 weeks has been trying to get up and walk every every hour at work.     Problem list and histories reviewed & adjusted, as indicated.  Additional history: as documented    Labs reviewed in EPIC    Reviewed and updated as needed this visit by clinical staff  Tobacco  Allergies  Meds  Problems  Med Hx  Surg Hx  Fam Hx  Soc Hx        Reviewed and updated as needed this visit by Provider  Allergies  Meds  Problems         ROS:  CONSTITUTIONAL:NEGATIVE for fever, chills, change in weight  RESP:NEGATIVE for significant cough or SOB  CV:  NEGATIVE for chest pain, palpitations NEGATIVE for any problems since her ablation 2 years ago ( SVT), POSITIVE for bilateral ankle, intermittent swelling  GI: no change in bowels  PSYCHIATRIC: Depression - use of bupropion reviewed   Neuro: no headache of vision changes  Skin: no rash  "or skin changes    This document serves as a record of the services and decisions personally performed and made by Verona Johnson MD. It was created on her behalf by Cali Sosa, a trained medical scribe. The creation of this document is based on the provider's statements to the medical scribe.   Cali Sosa, 4:12 PM, May 10, 2018    OBJECTIVE:     /68  Pulse 67  Temp 98  F (36.7  C) (Oral)  Resp 15  Ht 5' 7\" (1.702 m)  Wt 203 lb (92.1 kg)  LMP 03/23/2013  SpO2 99%  BMI 31.79 kg/m2  Body mass index is 31.79 kg/(m^2).  GENERAL: healthy, alert and no distress  EYES: Eyes grossly normal to inspection, PERRL and conjunctivae and sclerae normal  HENT: ear canals and TM's normal, nose and mouth without ulcers or lesions  NECK: no adenopathy, no asymmetry, masses, or scars and thyroid normal to palpation  RESP: lungs clear to auscultation - no rales, rhonchi or wheezes  CV: regular rate and rhythm, normal S1 S2, no murmur Slight +1 pitting pedal edema  PSYCH: mentation appears normal, affect normal/bright  PHQ-9 SCORE 3/15/2017 11/22/2017 5/10/2018   Total Score - - -   Total Score MyChart - - -   Total Score 20 8 9     PAMELA-7 SCORE 3/15/2017 11/22/2017 5/10/2018   Total Score - - -   Total Score - - -   Total Score 20 4 3         ASSESSMENT/PLAN:   (F32.0) Major depressive disorder, single episode, mild (H)  (primary encounter diagnosis)  Comment: Depression was worse over this past winter but pt not certain if more related to seasonal or chemical changes as cause of depression.   Plan: buPROPion (WELLBUTRIN XL) 300 MG 24 hr tablet; add Citalopram as well            (F43.0) Acute reaction to stress  Comment: Will continue Wellbutrin and add Citalopram. Begin counseling at [facility]. Prescribed,  Plan: citalopram (CELEXA) 10 MG tablet            (Z12.31) Visit for screening mammogram  Comment: Patient interested in scheduling her own mammogram  Plan: MA Screening Digital Bilateral        "     (Z23) Need for Tdap vaccination  Comment: Administered today   Plan: TDAP VACCINE (ADACEL), IMMUNIZATION ADMIN,         FIRST              MEDICATIONS:   Orders Placed This Encounter   Medications     buPROPion (WELLBUTRIN XL) 300 MG 24 hr tablet     Sig: Take 1 tablet (300 mg) by mouth every morning     Dispense:  90 tablet     Refill:  3     citalopram (CELEXA) 10 MG tablet     Sig: Take 1 tablet (10 mg) by mouth daily     Dispense:  30 tablet     Refill:  6     Medications Discontinued During This Encounter   Medication Reason     buPROPion (WELLBUTRIN XL) 300 MG 24 hr tablet Reorder     citalopram (CELEXA) 10 MG tablet Reorder       FUTURE APPOINTMENTS:       - Follow-up office or E-visit in 6-8 weeks    Verona Johnson MD  Internal Medicine  [facility] [place]    The information in this document, created by a medical scribe for me, accurately reflects the services I personally performed and the decisions made by me. I have reviewed and approved this document for accuracy.  Dr. Verona Johnson, 4:34 PM, May 10, 2018

## 2018-05-11 ASSESSMENT — ANXIETY QUESTIONNAIRES: GAD7 TOTAL SCORE: 3

## 2018-05-11 ASSESSMENT — PATIENT HEALTH QUESTIONNAIRE - PHQ9: SUM OF ALL RESPONSES TO PHQ QUESTIONS 1-9: 9

## 2018-06-04 DIAGNOSIS — F32.0 MAJOR DEPRESSIVE DISORDER, SINGLE EPISODE, MILD (H): ICD-10-CM

## 2018-06-04 NOTE — TELEPHONE ENCOUNTER
"Requested Prescriptions   Pending Prescriptions Disp Refills     buPROPion (WELLBUTRIN XL) 300 MG 24 hr tablet [Pharmacy Med Name: BUPROPION XL 300MG TAB]  Last Written Prescription Date:  5/10/18  Last Fill Quantity: 90,  # refills: 3   Last office visit: 5/10/2018 with prescribing provider:  5/10/2018     Future Office Visit:   Next 5 appointments (look out 90 days)     Jun 19, 2018  8:30 AM CDT   Office Visit with Sophia Jiménez DO   [facility] ([facility]    [address]  [facility] [address]   [phone]            Jun 25, 2018  3:30 PM CDT   Office Visit with Verona Johnson MD   [facility] ([address] [address] [phone]                  30 tablet 0     Sig: TAKE ONE TABLET BY MOUTH EVERY MORNING    SSRIs Protocol Failed    6/4/2018  7:54 AM       Failed - PHQ-9 score less than 5 in past 6 months    Please review last PHQ-9 score.   PHQ-9 SCORE 3/15/2017 11/22/2017 5/10/2018   Total Score - - -   Total Score MyChart - - -   Total Score 20 8 9     PAMELA-7 SCORE 3/15/2017 11/22/2017 5/10/2018   Total Score - - -   Total Score - - -   Total Score 20 4 3              Passed - Medication is Bupropion    If the medication is Bupropion (Wellbutrin), and the patient is taking for smoking cessation; OK to refill.         Passed - Patient is age 18 or older       Passed - No active pregnancy on record       Passed - No positive pregnancy test in last 12 months       Passed - Recent (6 mo) or future (30 days) visit within the authorizing provider's specialty    Patient had office visit in the last 6 months or has a visit in the next 30 days with authorizing provider or within the authorizing provider's specialty.  See \"Patient Info\" tab in inbasket, or \"Choose Columns\" in Meds & Orders section of the refill encounter.              "

## 2018-06-06 RX ORDER — BUPROPION HYDROCHLORIDE 300 MG/1
TABLET ORAL
Qty: 30 TABLET | Refills: 0 | OUTPATIENT
Start: 2018-06-06

## 2018-06-06 NOTE — TELEPHONE ENCOUNTER
Duplicate. Script was filled on 5/10/18 for 1 year supply.   Denial sent with note to pharmacy requesting they review.     Dana Johnson RN -- [facility]

## 2018-06-20 ENCOUNTER — HOSPITAL ENCOUNTER (OUTPATIENT)
Dept: MAMMOGRAPHY | Facility: CLINIC | Age: 58
Discharge: HOME | End: 2018-06-20
Attending: INTERNAL MEDICINE | Admitting: INTERNAL MEDICINE
Payer: COMMERCIAL

## 2018-06-20 DIAGNOSIS — Z12.31 VISIT FOR SCREENING MAMMOGRAM: ICD-10-CM

## 2018-06-20 PROCEDURE — 77063 BREAST TOMOSYNTHESIS BI: CPT

## 2018-06-25 ENCOUNTER — OFFICE VISIT (OUTPATIENT)
Dept: FAMILY MEDICINE | Facility: CLINIC | Age: 58
End: 2018-06-25
Payer: COMMERCIAL

## 2018-06-25 VITALS
SYSTOLIC BLOOD PRESSURE: 126 MMHG | DIASTOLIC BLOOD PRESSURE: 86 MMHG | TEMPERATURE: 98.3 F | WEIGHT: 201.6 LBS | RESPIRATION RATE: 16 BRPM | HEART RATE: 72 BPM | OXYGEN SATURATION: 97 % | BODY MASS INDEX: 31.58 KG/M2

## 2018-06-25 DIAGNOSIS — F32.0 MAJOR DEPRESSIVE DISORDER, SINGLE EPISODE, MILD (H): Primary | ICD-10-CM

## 2018-06-25 DIAGNOSIS — F43.0 ACUTE REACTION TO STRESS: ICD-10-CM

## 2018-06-25 PROCEDURE — 99214 OFFICE O/P EST MOD 30 MIN: CPT | Performed by: INTERNAL MEDICINE

## 2018-06-25 RX ORDER — CITALOPRAM HYDROBROMIDE 10 MG/1
10 TABLET ORAL DAILY
Qty: 90 TABLET | Refills: 3 | Status: SHIPPED | OUTPATIENT
Start: 2018-06-25 | End: 2019-04-29

## 2018-06-25 ASSESSMENT — ANXIETY QUESTIONNAIRES
1. FEELING NERVOUS, ANXIOUS, OR ON EDGE: NOT AT ALL
6. BECOMING EASILY ANNOYED OR IRRITABLE: NOT AT ALL
3. WORRYING TOO MUCH ABOUT DIFFERENT THINGS: NOT AT ALL
2. NOT BEING ABLE TO STOP OR CONTROL WORRYING: NOT AT ALL
7. FEELING AFRAID AS IF SOMETHING AWFUL MIGHT HAPPEN: SEVERAL DAYS
GAD7 TOTAL SCORE: 1
5. BEING SO RESTLESS THAT IT IS HARD TO SIT STILL: NOT AT ALL
IF YOU CHECKED OFF ANY PROBLEMS ON THIS QUESTIONNAIRE, HOW DIFFICULT HAVE THESE PROBLEMS MADE IT FOR YOU TO DO YOUR WORK, TAKE CARE OF THINGS AT HOME, OR GET ALONG WITH OTHER PEOPLE: NOT DIFFICULT AT ALL

## 2018-06-25 ASSESSMENT — PATIENT HEALTH QUESTIONNAIRE - PHQ9: 5. POOR APPETITE OR OVEREATING: NOT AT ALL

## 2018-06-25 NOTE — PROGRESS NOTES
SUBJECTIVE:   Ade Crowley is a 57 year old female who presents to clinic today for the following health issues:      Medication Followup of celexa    Taking Medication as prescribed: yes    Side Effects:  None    Medication Helping Symptoms:  Yes    PHQ-9 SCORE 2017 5/10/2018 2018   Total Score - - -   Total Score MyChart - - -   Total Score 8 9 1              Problem list and histories reviewed & adjusted, as indicated.  Additional history: as documented    Patient Active Problem List   Diagnosis     Pt reports Del Cid Parkinson White     Family history of malignant neoplasm of breast     Menorrhagia     CARDIOVASCULAR SCREENING; LDL GOAL LESS THAN 160     Lumbago     Nonallopathic lesion of sacral region     Pain in joint, pelvic region and thigh     Nonallopathic lesion of lower extremities     Mild major depression (H)     S/P hysterectomy     Special screening for malignant neoplasms, colon     Syncope     Jaret-Parkinson-White (WPW) syndrome     Vasovagal syncope     SVT (supraventricular tachycardia) (H)     Major depressive disorder, single episode, mild (H)     Past Surgical History:   Procedure Laterality Date     APPENDECTOMY       C NONSPECIFIC PROCEDURE      Tubal ligation     C NONSPECIFIC PROCEDURE      C-sections x 3     C NONSPECIFIC PROCEDURE      left breast biopsy--benign      SECTION  x3     COLONOSCOPY N/A 2014    Procedure: COMBINED COLONOSCOPY, SINGLE OR MULTIPLE BIOPSY/POLYPECTOMY BY BIOPSY;  Surgeon: Jesus Adler MD;  Location:  GI     DAVINCI HYSTERECTOMY SUPRACERVICAL  2013    Procedure: DAVINCI HYSTERECTOMY SUPRACERVICAL;  Frozen section dilation & curretage. Robotic Assisted  Laparoscopic Supracervical Hysterectomy, Bilateral Salpingectomy, Cystoscopy ;  Surgeon: Sophia Jiménez DO;  Location:  OR     DILATION AND CURETTAGE  2013    Procedure: DILATION AND CURETTAGE;;  Surgeon: Sophia Jiménez DO;  Location:  OR     H  ABLATION SVT  12/10/2015    WPW     HC REMOVAL OF TONSILS,12+ Y/O         Social History   Substance Use Topics     Smoking status: Former Smoker     Years: 20.00     Types: Cigarettes     Quit date: 2007     Smokeless tobacco: Never Used      Comment: 10/3/2015     Alcohol use 0.0 oz/week     0 Standard drinks or equivalent per week      Comment: occasional beer/wine, few drinks every other weekend     Family History   Problem Relation Age of Onset     Arthritis Mother      Cancer Mother      breast cancer stage 4 original [id number] recurred 2 years ago/skin cancer bcc     Gynecology Mother      uterine tumor with hysterectomy     Thyroid Disease Mother      hypothyroid     Breast Cancer Mother      Cardiovascular Father      chf  of heart attack age 68     Diabetes Daughter      type one diabetes   diagnosed age 9     Cancer Maternal Grandmother      bone cancer         Current Outpatient Prescriptions   Medication Sig Dispense Refill     buPROPion (WELLBUTRIN XL) 300 MG 24 hr tablet Take 1 tablet (300 mg) by mouth every morning 90 tablet 3     citalopram (CELEXA) 10 MG tablet Take 1 tablet (10 mg) by mouth daily 90 tablet 3     Allergies   Allergen Reactions     No Known Drug Allergies      BP Readings from Last 3 Encounters:   18 126/86   05/10/18 124/68   17 122/80    Wt Readings from Last 3 Encounters:   18 201 lb 9.6 oz (91.4 kg)   05/10/18 203 lb (92.1 kg)   17 194 lb (88 kg)                  Labs reviewed in EPIC    Reviewed and updated as needed this visit by clinical staff  Tobacco  Allergies  Meds  Problems  Med Hx  Surg Hx  Fam Hx  Soc Hx        Reviewed and updated as needed this visit by Provider  Allergies  Meds  Problems         ROS:  CONSTITUTIONAL: NEGATIVE for fever, chills, change in weight  ENT/MOUTH: NEGATIVE for ear, mouth and throat problems  RESP: NEGATIVE for significant cough or SOB  CV: NEGATIVE for chest pain, palpitations or peripheral  edema and no recurrent SVT  MUSCULOSKELETAL: NEGATIVE for significant arthralgias or myalgia  NEURO: NEGATIVE for weakness, dizziness or paresthesias  HEME/ALLERGY/IMMUNE: NEGATIVE for bleeding problems  PSYCHIATRIC: NEGATIVE for changes in mood or affect    OBJECTIVE:     /86 (BP Location: Right arm, Patient Position: Chair, Cuff Size: Adult Regular)  Pulse 72  Temp 98.3  F (36.8  C) (Oral)  Resp 16  Wt 201 lb 9.6 oz (91.4 kg)  LMP 03/23/2013  SpO2 97%  BMI 31.58 kg/m2  Body mass index is 31.58 kg/(m^2).  GENERAL: healthy, alert and no distress  NECK: no adenopathy, no asymmetry, masses, or scars and thyroid normal to palpation  RESP: lungs clear to auscultation - no rales, rhonchi or wheezes  CV: regular rates and rhythm, peripheral pulses strong and no peripheral edema  ABDOMEN: soft, nontender, no hepatosplenomegaly, no masses and bowel sounds normal  MS: no gross musculoskeletal defects noted, no edema  SKIN: no suspicious lesions or rashes  NEURO: Normal strength and tone, mentation intact and speech normal  PSYCH: mentation appears normal, affect normal/bright  PAMELA-7 SCORE 11/22/2017 5/10/2018 6/25/2018   Total Score - - -   Total Score - - -   Total Score 4 3 1       ASSESSMENT/PLAN:     (F32.0) Major depressive disorder, single episode, mild (H)  (primary encounter diagnosis)  Comment: medications reviewed.  Plan: meds and EAP; see below    (F43.0) Acute reaction to stress  Comment: continue Wellbutrin, add Citalopram and consider counseling; [facility] information reviewed.   Plan: citalopram (CELEXA) 10 MG tablet        EAP also available- Discussed added benefits from counseling; encourage her to check into EAP- Employee Assistance Program; mental health counseling contracted through employee; allows for early assessment and treatment options related to stress management, anxiety, relationship concerns or depression; most plans will allow 3 visits, some provide 6.        FUTURE APPOINTMENTS:       - Follow-up visit in 6  Months.    Verona Johnson MD  Internal Medicine   [facility]

## 2018-06-25 NOTE — MR AVS SNAPSHOT
After Visit Summary   6/25/2018    Ade Crowley    MRN: [id number]           Patient Information     Date Of Birth          1960        Visit Information        Provider Department      6/25/2018 3:30 PM Verona Johnson MD [facility]        Today's Diagnoses     Major depressive disorder, single episode, mild (H)    -  1    Acute reaction to stress           Follow-ups after your visit        Your next 10 appointments already scheduled     Jul 26, 2018  2:15 PM CDT   Office Visit with Sophia Jiménez,    [facility] ([facility])    [address]  [facility] [address] [phone]           Bring a current list of meds and any records pertaining to this visit. For Physicals, please bring immunization records and any forms needing to be filled out. Please arrive 10 minutes early to complete paperwork.              Who to contact     If you have questions or need follow up information about today's clinic visit or your schedule please contact [facility] directly at [phone].  Normal or non-critical lab and imaging results will be communicated to you by [employer], letter or phone within 4 business days after the clinic has received the results. If you do not hear from us within 7 days, please contact the clinic through [employer] or phone. If you have a critical or abnormal lab result, we will notify you by phone as soon as possible.  Submit refill requests through [employer] or call your pharmacy and they will forward the refill request to us. Please allow 3 business days for your refill to be completed.          Additional Information About Your Visit        [employer] Information     [employer] gives you secure access to your electronic health record. If you see a primary care provider, you can also send messages to your care team and make appointments. If you have questions, please call your primary care clinic.   If you do not have a primary care provider, please call [phone] and they will assist you.        Care EveryWhere ID     This is your Care EveryWhere ID. This could be used by other organizations to access your [place] medical records  [id number]        Your Vitals Were     Pulse Temperature Respirations Last Period Pulse Oximetry BMI (Body Mass Index)    72 98.3  F (36.8  C) (Oral) 16 03/23/2013 97% 31.58 kg/m2       Blood Pressure from Last 3 Encounters:   06/25/18 126/86   05/10/18 124/68   06/30/17 122/80    Weight from Last 3 Encounters:   06/25/18 201 lb 9.6 oz (91.4 kg)   05/10/18 203 lb (92.1 kg)   06/30/17 194 lb (88 kg)              Today, you had the following     No orders found for display         Where to get your medicines      These medications were sent to [facility] [address]  [place], [place] - [address]  [address] [place] [address]     Phone:  [phone]     citalopram 10 MG tablet          Primary Care Provider Office Phone # Fax #    Verona Raghu Johnson [phone] [address]  [facility] [address]        Equal Access to Services     EDITA ALVARADO AH: Hadii aad ku hadasho Soomaali, waaxda luqadaha, qaybta kaalmada adeegyada, waxay idiin hayaan kaycee mathews . So [facility] [phone].    ATENCIÓN: Si habla español, tiene a orozco disposición servicios gratuitos de asistencia lingüística. Llame al [phone].    We comply with applicable federal civil rights laws and [place] laws. We do not discriminate on the basis of race, color, national origin, age, disability, sex, sexual orientation, or gender identity.            Thank you!     Thank you for choosing [facility]  for your care. Our goal is always to provide you with excellent care. Hearing back from our patients is one way we can continue to improve our services. Please take a few minutes to complete the written survey that you may receive in the mail after your visit  with us. Thank you!             Your Updated Medication List - Protect others around you: Learn how to safely use, store and throw away your medicines at www.disposemymeds.org.          This list is accurate as of 6/25/18  4:43 PM.  Always use your most recent med list.                   Brand Name Dispense Instructions for use Diagnosis    buPROPion 300 MG 24 hr tablet    WELLBUTRIN XL    90 tablet    Take 1 tablet (300 mg) by mouth every morning    Major depressive disorder, single episode, mild (H)       citalopram 10 MG tablet    celeXA    90 tablet    Take 1 tablet (10 mg) by mouth daily    Acute reaction to stress

## 2018-06-26 ASSESSMENT — PATIENT HEALTH QUESTIONNAIRE - PHQ9: SUM OF ALL RESPONSES TO PHQ QUESTIONS 1-9: 1

## 2018-06-26 ASSESSMENT — ANXIETY QUESTIONNAIRES: GAD7 TOTAL SCORE: 1

## 2018-09-04 ENCOUNTER — OFFICE VISIT (OUTPATIENT)
Dept: OBGYN | Facility: CLINIC | Age: 58
End: 2018-09-04
Payer: COMMERCIAL

## 2018-09-04 VITALS
SYSTOLIC BLOOD PRESSURE: 106 MMHG | HEIGHT: 67 IN | WEIGHT: 199.4 LBS | BODY MASS INDEX: 31.3 KG/M2 | DIASTOLIC BLOOD PRESSURE: 64 MMHG

## 2018-09-04 DIAGNOSIS — Z00.00 ROUTINE GENERAL MEDICAL EXAMINATION AT A HEALTH CARE FACILITY: Primary | ICD-10-CM

## 2018-09-04 LAB
ALBUMIN SERPL-MCNC: 3.8 G/DL (ref 3.4–5)
ALP SERPL-CCNC: 94 U/L (ref 40–150)
ALT SERPL W P-5'-P-CCNC: 29 U/L (ref 0–50)
ANION GAP SERPL CALCULATED.3IONS-SCNC: 8 MMOL/L (ref 3–14)
AST SERPL W P-5'-P-CCNC: 16 U/L (ref 0–45)
BILIRUB SERPL-MCNC: 0.3 MG/DL (ref 0.2–1.3)
BUN SERPL-MCNC: 21 MG/DL (ref 7–30)
CALCIUM SERPL-MCNC: 8.9 MG/DL (ref 8.5–10.1)
CHLORIDE SERPL-SCNC: 106 MMOL/L (ref 94–109)
CHOLEST SERPL-MCNC: 181 MG/DL
CO2 SERPL-SCNC: 27 MMOL/L (ref 20–32)
CREAT SERPL-MCNC: 0.79 MG/DL (ref 0.52–1.04)
ERYTHROCYTE [DISTWIDTH] IN BLOOD BY AUTOMATED COUNT: 12.9 % (ref 10–15)
GFR SERPL CREATININE-BSD FRML MDRD: 75 ML/MIN/1.7M2
GLUCOSE SERPL-MCNC: 110 MG/DL (ref 70–99)
HCT VFR BLD AUTO: 40.7 % (ref 35–47)
HDLC SERPL-MCNC: 37 MG/DL
HGB BLD-MCNC: 13.2 G/DL (ref 11.7–15.7)
LDLC SERPL CALC-MCNC: 96 MG/DL
MCH RBC QN AUTO: 30.3 PG (ref 26.5–33)
MCHC RBC AUTO-ENTMCNC: 32.4 G/DL (ref 31.5–36.5)
MCV RBC AUTO: 94 FL (ref 78–100)
NONHDLC SERPL-MCNC: 144 MG/DL
PLATELET # BLD AUTO: 235 10E9/L (ref 150–450)
POTASSIUM SERPL-SCNC: 4.7 MMOL/L (ref 3.4–5.3)
PROT SERPL-MCNC: 6.5 G/DL (ref 6.8–8.8)
RBC # BLD AUTO: 4.35 10E12/L (ref 3.8–5.2)
SODIUM SERPL-SCNC: 141 MMOL/L (ref 133–144)
TRIGL SERPL-MCNC: 239 MG/DL
TSH SERPL DL<=0.005 MIU/L-ACNC: 2.95 MU/L (ref 0.4–4)
WBC # BLD AUTO: 5.7 10E9/L (ref 4–11)

## 2018-09-04 PROCEDURE — 80061 LIPID PANEL: CPT | Performed by: FAMILY MEDICINE

## 2018-09-04 PROCEDURE — 80053 COMPREHEN METABOLIC PANEL: CPT | Performed by: FAMILY MEDICINE

## 2018-09-04 PROCEDURE — 84443 ASSAY THYROID STIM HORMONE: CPT | Performed by: FAMILY MEDICINE

## 2018-09-04 PROCEDURE — 99396 PREV VISIT EST AGE 40-64: CPT | Performed by: FAMILY MEDICINE

## 2018-09-04 PROCEDURE — 85027 COMPLETE CBC AUTOMATED: CPT | Performed by: FAMILY MEDICINE

## 2018-09-04 PROCEDURE — 36415 COLL VENOUS BLD VENIPUNCTURE: CPT | Performed by: FAMILY MEDICINE

## 2018-09-04 NOTE — MR AVS SNAPSHOT
"              After Visit Summary   9/4/2018    Ade Crowley    MRN: [id number]           Patient Information     Date Of Birth          1960        Visit Information        Provider Department      9/4/2018 8:00 AM Sophia Jiménez, DO [facility]        Today's Diagnoses     Routine general medical examination at a health care facility    -  1      Care Instructions    Return yearly       Dr. Sophia Jiménez, DO    Obstetrics and Gynecology  [facility]                 Follow-ups after your visit        Who to contact     If you have questions or need follow up information about today's clinic visit or your schedule please contact [facility] directly at [phone].  Normal or non-critical lab and imaging results will be communicated to you by MyChart, letter or phone within 4 business days after the clinic has received the results. If you do not hear from us within 7 days, please contact the clinic through [employer] or phone. If you have a critical or abnormal lab result, we will notify you by phone as soon as possible.  Submit refill requests through [employer] or call your pharmacy and they will forward the refill request to us. Please allow 3 business days for your refill to be completed.          Additional Information About Your Visit        MyChart Information     [employer] gives you secure access to your electronic health record. If you see a primary care provider, you can also send messages to your care team and make appointments. If you have questions, please call your primary care clinic.  If you do not have a primary care provider, please call [phone] and they will assist you.        Care EveryWhere ID     This is your Care EveryWhere ID. This could be used by other organizations to access your [place] medical records  [id number]        Your Vitals Were     Height Last Period BMI (Body Mass Index)             5' 7\" (1.702 m) " 03/23/2013 31.23 kg/m2          Blood Pressure from Last 3 Encounters:   09/04/18 106/64   06/25/18 126/86   05/10/18 124/68    Weight from Last 3 Encounters:   09/04/18 199 lb 6.4 oz (90.4 kg)   06/25/18 201 lb 9.6 oz (91.4 kg)   05/10/18 203 lb (92.1 kg)              Today, you had the following     No orders found for display       Primary Care Provider Office Phone # Fax #    Verona Raghu Johnson [phone] [address]  [facility] [address]        Equal Access to Services     [facility]: Hadii gail sánchez hadasho Sosommer, waaxda luqadaha, qaybta kaalmada adefabriceyada, venice mathews . So [facility] [phone].    ATENCIÓN: Si habla español, tiene a orozco disposición servicios gratuitos de asistencia lingüística. Llame al [phone].    We comply with applicable federal civil rights laws and [place] laws. We do not discriminate on the basis of race, color, national origin, age, disability, sex, sexual orientation, or gender identity.            Thank you!     Thank you for choosing [facility]  for your care. Our goal is always to provide you with excellent care. Hearing back from our patients is one way we can continue to improve our services. Please take a few minutes to complete the written survey that you may receive in the mail after your visit with us. Thank you!             Your Updated Medication List - Protect others around you: Learn how to safely use, store and throw away your medicines at www.disposemymeds.org.          This list is accurate as of 9/4/18  8:21 AM.  Always use your most recent med list.                   Brand Name Dispense Instructions for use Diagnosis    buPROPion 300 MG 24 hr tablet    WELLBUTRIN XL    90 tablet    Take 1 tablet (300 mg) by mouth every morning    Major depressive disorder, single episode, mild (H)       citalopram 10 MG tablet    celeXA    90 tablet    Take 1 tablet (10 mg) by mouth daily    Acute  reaction to stress

## 2018-09-04 NOTE — PATIENT INSTRUCTIONS
Return yearly       Dr. Sophia Jiménez, DO    Obstetrics and Gynecology  [facility]3] - [place] [facility] [place]

## 2018-09-04 NOTE — NURSING NOTE
"Chief Complaint   Patient presents with     Gyn Exam       Initial /64  Ht 5' 7\" (1.702 m)  Wt 199 lb 6.4 oz (90.4 kg)  LMP 2013  BMI 31.23 kg/m2 Estimated body mass index is 31.23 kg/(m^2) as calculated from the following:    Height as of this encounter: 5' 7\" (1.702 m).    Weight as of this encounter: 199 lb 6.4 oz (90.4 kg).  BP completed using cuff size: regular        The following HM Due: pap smear        "

## 2018-09-04 NOTE — PROGRESS NOTES
SUBJECTIVE:  Ade Crowley is an 58 year old  postmenopausal woman   who presents for annual gyn exam. Menopause at age 54 with hysterectomy supracervical due to severe bladder scarring. No   bleeding, spotting, or discharge noted. Concerns:  none    Estrogen replacement therapy: never  NILA exposure: no  History of abnormal Pap smear: No  Family history of uterine or ovarian cancer: No  Regular self breast exam: Yes  History of abnormal mammogram: No  Family history of breast cancer: Yes: Mother age  age 77, had cancer since age 60  History of abnormal lipids: No  MGM:  Kidney or bone cancer     Past Medical History:   Diagnosis Date     Anesthesia complication     pt states with one of her C-sections the epidural didn't work and she needed general anesthesia     Depression      Infrequent menses     & heavy menses     SVT (supraventricular tachycardia) (H)     S/p ablation 12/10/15     WPW (Jaret-Parkinson-White syndrome) 1997          Family History   Problem Relation Age of Onset     Arthritis Mother      Cancer Mother      breast cancer stage 4 original [id number] recurred 2 years ago/skin cancer bcc     Gynecology Mother      uterine tumor with hysterectomy     Thyroid Disease Mother      hypothyroid     Breast Cancer Mother      Cardiovascular Father      chf  of heart attack age 68     Diabetes Daughter      type one diabetes   diagnosed age 9     Cancer Maternal Grandmother      bone cancer       Past Surgical History:   Procedure Laterality Date     APPENDECTOMY       C NONSPECIFIC PROCEDURE      Tubal ligation     C NONSPECIFIC PROCEDURE      C-sections x 3     C NONSPECIFIC PROCEDURE      left breast biopsy--benign      SECTION  x3     COLONOSCOPY N/A 2014    Procedure: COMBINED COLONOSCOPY, SINGLE OR MULTIPLE BIOPSY/POLYPECTOMY BY BIOPSY;  Surgeon: Jesus Adler MD;  Location:  GI     DAVINCI HYSTERECTOMY SUPRACERVICAL  2013    Procedure: DAVINCI  "HYSTERECTOMY SUPRACERVICAL;  Frozen section dilation & curretage. Robotic Assisted  Laparoscopic Supracervical Hysterectomy, Bilateral Salpingectomy, Cystoscopy ;  Surgeon: Sophia Jiménez DO;  Location: [facility] OR     DILATION AND CURETTAGE  4/9/2013    Procedure: DILATION AND CURETTAGE;;  Surgeon: Sophia Jiménez DO;  Location: [facility] OR     H ABLATION SVT  12/10/2015    WPW     HC REMOVAL OF TONSILS,12+ Y/O  1978       Current Outpatient Prescriptions   Medication     buPROPion (WELLBUTRIN XL) 300 MG 24 hr tablet     citalopram (CELEXA) 10 MG tablet     No current facility-administered medications for this visit.      Allergies   Allergen Reactions     No Known Drug Allergies        Social History   Substance Use Topics     Smoking status: Former Smoker     Years: 20.00     Types: Cigarettes     Quit date: 9/1/2007     Smokeless tobacco: Never Used      Comment: 10/3/2015     Alcohol use 0.0 oz/week     0 Standard drinks or equivalent per week      Comment: occasional beer/wine, few drinks every other weekend       Review Of Systems  Ears/Nose/Throat: negative  Respiratory: No shortness of breath, dyspnea on exertion, cough, or hemoptysis  Cardiovascular: negative  Gastrointestinal: negative  Genitourinary: negative  Constitutional, HEENT, cardiovascular, pulmonary, GI, , musculoskeletal, neuro, skin, endocrine and psych systems are negative, except as otherwise noted.    OBJECTIVE:  /64  Ht 5' 7\" (1.702 m)  Wt 199 lb 6.4 oz (90.4 kg)  LMP 03/23/2013  BMI 31.23 kg/m2  General appearance: healthy, alert and no distress  Skin: Skin color, texture, turgor normal. No rashes or lesions.  Ears: negative  Nose/Sinuses: Nares normal. Septum midline. Mucosa normal. No drainage or sinus tenderness.  Oropharynx: Lips, mucosa, and tongue normal. Teeth and gums normal.  Neck: Neck supple. No adenopathy. Thyroid symmetric, normal size,, Carotids without bruits.  Lungs: negative, Percussion normal. Good " diaphragmatic excursion. Lungs clear  Heart: negative, PMI normal. No lifts, heaves, or thrills. RRR. No murmurs, clicks gallops or rub  Breasts: Inspection negative. No nipple discharge or bleeding. No masses.  Abdomen: Abdomen soft, non-tender. BS normal. No masses, organomegaly  Pelvic: Pelvic:  Pelvic examination with no pap  including  External genitalia normal   and vagina normal rugatted not atrophic  Examination of urethra  normal no masses, tenderness, scarring  bladder, no masses or tenderness  Cervix no lesions or discharge  Bimanual exam with   Uterus surgically absent       ASSESSMENT:  Ade Crowley is an 58 year old  postmenopausal woman   who presents for annual gyn exam.  Concerns:  None   PLAN:  Dx:  1)  Pap smear, mammogram  2)  Lipids at appropriate intervals  3)  Mother with breast cancer risk: recommend yearly mammogram, yearly breast exams,   Consider seeing breast surgeon  4)  History of smoking, quit after SVT x 3 years, exercises, discussed adding calcium to diet    PE:  Reviewed health maintenance including diet, regular exercise,   estrogen replacement and periodic exams.    Dr. Sophia Jiménez, DO    Obstetrics and Gynecology  [facility]

## 2019-04-29 ENCOUNTER — MYC REFILL (OUTPATIENT)
Dept: FAMILY MEDICINE | Facility: CLINIC | Age: 59
End: 2019-04-29

## 2019-04-29 DIAGNOSIS — F32.0 MAJOR DEPRESSIVE DISORDER, SINGLE EPISODE, MILD (H): ICD-10-CM

## 2019-04-29 DIAGNOSIS — F43.0 ACUTE REACTION TO STRESS: ICD-10-CM

## 2019-05-01 NOTE — TELEPHONE ENCOUNTER
"Citalopram  LRF 6/25/18  LOV 6/25/18    Bupropion  LRF 5/10/18  LOV 6/25/18    Requested Prescriptions   Pending Prescriptions Disp Refills     buPROPion (WELLBUTRIN XL) 300 MG 24 hr tablet 90 tablet 3     Sig: Take 1 tablet (300 mg) by mouth every morning       SSRIs Protocol Failed - 4/29/2019  9:31 AM        Failed - PHQ-9 score less than 5 in past 6 months     Please review last PHQ-9 score.           Failed - Recent (6 mo) or future (30 days) visit within the authorizing provider's specialty     Patient had office visit in the last 6 months or has a visit in the next 30 days with authorizing provider or within the authorizing provider's specialty.  See \"Patient Info\" tab in inbasket, or \"Choose Columns\" in Meds & Orders section of the refill encounter.            Passed - Medication is Bupropion     If the medication is Bupropion (Wellbutrin), and the patient is taking for smoking cessation; OK to refill.          Passed - Medication is active on med list        Passed - Patient is age 18 or older        Passed - No active pregnancy on record        Passed - No positive pregnancy test in last 12 months        citalopram (CELEXA) 10 MG tablet 90 tablet 3     Sig: Take 1 tablet (10 mg) by mouth daily       SSRIs Protocol Failed - 4/29/2019  9:31 AM        Failed - PHQ-9 score less than 5 in past 6 months     Please review last PHQ-9 score.           Failed - Recent (6 mo) or future (30 days) visit within the authorizing provider's specialty     Patient had office visit in the last 6 months or has a visit in the next 30 days with authorizing provider or within the authorizing provider's specialty.  See \"Patient Info\" tab in inbasket, or \"Choose Columns\" in Meds & Orders section of the refill encounter.            Passed - Medication is Bupropion     If the medication is Bupropion (Wellbutrin), and the patient is taking for smoking cessation; OK to refill.          Passed - Medication is active on med list        " Passed - Patient is age 18 or older        Passed - No active pregnancy on record        Passed - No positive pregnancy test in last 12 months        PHQ-9 SCORE 11/22/2017 5/10/2018 6/25/2018   PHQ-9 Total Score - - -   PHQ-9 Total Score MyChart - - -   PHQ-9 Total Score 8 9 1     Routing refill request to provider for review/approval because:  Patient needs to be seen because:  Was to be seen 12/18 and overdue for updated phq9, notified via [employer]

## 2019-05-03 RX ORDER — CITALOPRAM HYDROBROMIDE 10 MG/1
10 TABLET ORAL DAILY
Qty: 30 TABLET | Refills: 0 | Status: SHIPPED | OUTPATIENT
Start: 2019-05-03 | End: 2019-06-25

## 2019-05-03 RX ORDER — BUPROPION HYDROCHLORIDE 300 MG/1
300 TABLET ORAL EVERY MORNING
Qty: 30 TABLET | Refills: 0 | Status: SHIPPED | OUTPATIENT
Start: 2019-05-03 | End: 2019-06-25

## 2019-05-03 NOTE — TELEPHONE ENCOUNTER
Pt due for appt. Last seen 6/25/2018 and recommended 6 month follow up.  PHQ-9 and PAMELA-7 is not up to date..update, and could consider short term evaluation with [facility].

## 2019-06-25 ENCOUNTER — OFFICE VISIT (OUTPATIENT)
Dept: FAMILY MEDICINE | Facility: CLINIC | Age: 59
End: 2019-06-25
Payer: COMMERCIAL

## 2019-06-25 VITALS
OXYGEN SATURATION: 95 % | DIASTOLIC BLOOD PRESSURE: 84 MMHG | HEART RATE: 68 BPM | WEIGHT: 203.9 LBS | RESPIRATION RATE: 16 BRPM | HEIGHT: 67 IN | TEMPERATURE: 98.2 F | SYSTOLIC BLOOD PRESSURE: 118 MMHG | BODY MASS INDEX: 32 KG/M2

## 2019-06-25 DIAGNOSIS — I45.6 WOLFF-PARKINSON-WHITE (WPW) SYNDROME: ICD-10-CM

## 2019-06-25 DIAGNOSIS — F32.0 MAJOR DEPRESSIVE DISORDER, SINGLE EPISODE, MILD (H): Primary | ICD-10-CM

## 2019-06-25 DIAGNOSIS — I47.10 SVT (SUPRAVENTRICULAR TACHYCARDIA) (H): ICD-10-CM

## 2019-06-25 DIAGNOSIS — F43.0 ACUTE REACTION TO STRESS: ICD-10-CM

## 2019-06-25 PROCEDURE — 99214 OFFICE O/P EST MOD 30 MIN: CPT | Performed by: INTERNAL MEDICINE

## 2019-06-25 RX ORDER — CITALOPRAM HYDROBROMIDE 10 MG/1
10 TABLET ORAL DAILY
Qty: 90 TABLET | Refills: 3 | Status: SHIPPED | OUTPATIENT
Start: 2019-06-25 | End: 2020-07-15

## 2019-06-25 RX ORDER — BUPROPION HYDROCHLORIDE 300 MG/1
300 TABLET ORAL EVERY MORNING
Qty: 90 TABLET | Refills: 3 | Status: SHIPPED | OUTPATIENT
Start: 2019-06-25 | End: 2020-07-15

## 2019-06-25 ASSESSMENT — PATIENT HEALTH QUESTIONNAIRE - PHQ9
SUM OF ALL RESPONSES TO PHQ QUESTIONS 1-9: 2
5. POOR APPETITE OR OVEREATING: NOT AT ALL

## 2019-06-25 ASSESSMENT — ANXIETY QUESTIONNAIRES
3. WORRYING TOO MUCH ABOUT DIFFERENT THINGS: NOT AT ALL
7. FEELING AFRAID AS IF SOMETHING AWFUL MIGHT HAPPEN: NOT AT ALL
GAD7 TOTAL SCORE: 0
IF YOU CHECKED OFF ANY PROBLEMS ON THIS QUESTIONNAIRE, HOW DIFFICULT HAVE THESE PROBLEMS MADE IT FOR YOU TO DO YOUR WORK, TAKE CARE OF THINGS AT HOME, OR GET ALONG WITH OTHER PEOPLE: NOT DIFFICULT AT ALL
2. NOT BEING ABLE TO STOP OR CONTROL WORRYING: NOT AT ALL
1. FEELING NERVOUS, ANXIOUS, OR ON EDGE: NOT AT ALL
5. BEING SO RESTLESS THAT IT IS HARD TO SIT STILL: NOT AT ALL
6. BECOMING EASILY ANNOYED OR IRRITABLE: NOT AT ALL

## 2019-06-25 ASSESSMENT — MIFFLIN-ST. JEOR: SCORE: 1542.51

## 2019-06-25 NOTE — PROGRESS NOTES
Subjective     Ade Crowley is a 58 year old female who presents to clinic today for the following health issues:    HPI   1. Medication Followup of Wellbutrin and Celexa    Taking Medication as prescribed: yes    Side Effects:  None    Medication Helping Symptoms:  yes       2. Mole Check      Duration: has had several for a long time    Description (location/character/radiation): across back, different parts of body, one on face    Intensity:  mild    Accompanying signs and symptoms: denies any change except maybe the one on her face (family thinks it has changed but patient doesn't think so), and denies any pain or itching    History (similar episodes/previous evaluation): None    Precipitating or alleviating factors: None    Therapies tried and outcome: None       Patient Active Problem List   Diagnosis     Pt reports Del Icd Parkinson White     Family history of malignant neoplasm of breast     Menorrhagia     CARDIOVASCULAR SCREENING; LDL GOAL LESS THAN 160     Lumbago     Nonallopathic lesion of sacral region     Pain in joint, pelvic region and thigh     Nonallopathic lesion of lower extremities     Mild major depression (H)     S/P hysterectomy     Special screening for malignant neoplasms, colon     Syncope     Jaret-Parkinson-White (WPW) syndrome     Vasovagal syncope     SVT (supraventricular tachycardia) (H)     Major depressive disorder, single episode, mild (H)     Past Surgical History:   Procedure Laterality Date     APPENDECTOMY       C NONSPECIFIC PROCEDURE      Tubal ligation     C NONSPECIFIC PROCEDURE      C-sections x 3     C NONSPECIFIC PROCEDURE      left breast biopsy--benign      SECTION  x3     COLONOSCOPY N/A 2014    Procedure: COMBINED COLONOSCOPY, SINGLE OR MULTIPLE BIOPSY/POLYPECTOMY BY BIOPSY;  Surgeon: Jesus Adler MD;  Location:  GI     DAVINCI HYSTERECTOMY SUPRACERVICAL  2013    Procedure: DAVINCI HYSTERECTOMY SUPRACERVICAL;  Frozen section dilation &  deonna. Robotic Assisted  Laparoscopic Supracervical Hysterectomy, Bilateral Salpingectomy, Cystoscopy ;  Surgeon: Sophia Jiménez DO;  Location: [facility] OR     DILATION AND CURETTAGE  2013    Procedure: DILATION AND CURETTAGE;;  Surgeon: Sophia Jiménez DO;  Location: [facility] OR     H ABLATION SVT  12/10/2015    WPW     HC REMOVAL OF TONSILS,12+ Y/O         Social History     Tobacco Use     Smoking status: Former Smoker     Years: 20.00     Types: Cigarettes     Last attempt to quit: 2007     Years since quittin.9     Smokeless tobacco: Never Used     Tobacco comment: 10/3/2015   Substance Use Topics     Alcohol use: Yes     Alcohol/week: 0.0 oz     Comment: occasional beer/wine, few drinks every other weekend     Family History   Problem Relation Age of Onset     Arthritis Mother      Cancer Mother         breast cancer stage 4 original [id number] recurred 2 years ago/skin cancer bcc     Gynecology Mother         uterine tumor with hysterectomy     Thyroid Disease Mother         hypothyroid     Breast Cancer Mother      Cardiovascular Father         chf  of heart attack age 68     Diabetes Daughter         type one diabetes   diagnosed age 9     Cancer Maternal Grandmother         bone cancer         Current Outpatient Medications   Medication Sig Dispense Refill     buPROPion (WELLBUTRIN XL) 300 MG 24 hr tablet Take 1 tablet (300 mg) by mouth every morning DUE FOR AN APPT 90 tablet 3     citalopram (CELEXA) 10 MG tablet Take 1 tablet (10 mg) by mouth daily DUE FOR AN APPT 90 tablet 3     Allergies   Allergen Reactions     No Known Drug Allergies      Recent Labs   Lab Test 18  0832 16  0812  10/03/15  1620 06/03/15  1016   LDL 96 107*  --   --  98   HDL 37* 42*  --   --  47*   TRIG 239* 130  --   --  87   ALT 29 19  --  28 26   CR 0.79 0.78   < > 0.74 0.74   GFRESTIMATED 75 77   < > 82 82   GFRESTBLACK >90 >90  African American GFR Calc     < > >90   GFR  "Calc   >90   GFR Calc     POTASSIUM 4.7 4.0   < > 3.5 4.3   TSH 2.95 2.29  --  2.81 2.15    < > = values in this interval not displayed.      BP Readings from Last 3 Encounters:   06/25/19 118/84   09/04/18 106/64   06/25/18 126/86    Wt Readings from Last 3 Encounters:   06/25/19 92.5 kg (203 lb 14.4 oz)   09/04/18 90.4 kg (199 lb 6.4 oz)   06/25/18 91.4 kg (201 lb 9.6 oz)                    Reviewed and updated as needed this visit by Provider         Review of Systems   ROS COMP: CONSTITUTIONAL: NEGATIVE for fever, chills, change in weight  ENT/MOUTH: NEGATIVE for ear, mouth and throat problems  RESP: NEGATIVE for significant cough or SOB  CV: hx of SVT; s/p ablation in 2015  GI: NEGATIVE for nausea, abdominal pain, heartburn, or change in bowel habits  MUSCULOSKELETAL: NEGATIVE for significant arthralgias or myalgia  NEURO: NEGATIVE for weakness, dizziness or paresthesias  ENDOCRINE: NEGATIVE for temperature intolerance, skin/hair changes  PSYCHIATRIC: depression; mood changes- meds well tolerated; effective      Objective    /84 (BP Location: Right arm, Patient Position: Chair, Cuff Size: Adult Regular)   Pulse 68   Temp 98.2  F (36.8  C) (Oral)   Resp 16   Ht 1.702 m (5' 7\")   Wt 92.5 kg (203 lb 14.4 oz)   LMP 03/23/2013 (Approximate)   SpO2 95%   Breastfeeding? No   BMI 31.94 kg/m    Body mass index is 31.94 kg/m .  Physical Exam   GENERAL: healthy, alert and no distress  NECK: no adenopathy, no asymmetry, masses, or scars and thyroid normal to palpation  RESP: lungs clear to auscultation - no rales, rhonchi or wheezes  CV: regular rates and rhythm, normal S1 S2, no S3 or S4, peripheral pulses strong and no peripheral edema  ABDOMEN: soft, nontender and bowel sounds normal  MS: no gross musculoskeletal defects noted, no edema  NEURO: Normal strength and tone, mentation intact and speech normal  PSYCH: mentation appears normal, affect normal/bright    Diagnostic Test " "Results:  Labs reviewed in [facility]  PHQ-9 SCORE 5/10/2018 6/25/2018 6/25/2019   PHQ-9 Total Score - - -   PHQ-9 Total Score MyChart - - -   PHQ-9 Total Score 9 1 2     PAMELA-7 SCORE 5/10/2018 6/25/2018 6/25/2019   Total Score - - -   Total Score - - -   Total Score 3 1 0               Assessment & Plan     (F32.0) Major depressive disorder, single episode, mild (H)  (primary encounter diagnosis)  Comment: overall feeling meds are effective  Plan: buPROPion (WELLBUTRIN XL) 300 MG 24 hr tablet          (F43.0) Acute reaction to stress  Comment: continue Wellbutrin, Citalopram; well tolerated medications  Plan: citalopram (CELEXA) 10 MG tablet          (I47.1) SVT (supraventricular tachycardia) (H)  Comment: known hx of WPW; ablation for SVT 12/10/2015  Plan:     (I45.6) Jaret-Parkinson-White (WPW) syndrome  Comment: ablation 12/10/2015  Plan:      BMI:   Estimated body mass index is 31.94 kg/m  as calculated from the following:    Height as of this encounter: 1.702 m (5' 7\").    Weight as of this encounter: 92.5 kg (203 lb 14.4 oz).   Weight management plan: Discussed healthy diet and exercise guidelines      No follow-ups on file.    Verona Johnson MD  Internal Medicine   [facility]    "

## 2019-06-26 ENCOUNTER — HOSPITAL ENCOUNTER (OUTPATIENT)
Dept: MAMMOGRAPHY | Facility: CLINIC | Age: 59
Discharge: HOME | End: 2019-06-26
Attending: INTERNAL MEDICINE | Admitting: INTERNAL MEDICINE
Payer: COMMERCIAL

## 2019-06-26 DIAGNOSIS — Z12.31 VISIT FOR SCREENING MAMMOGRAM: ICD-10-CM

## 2019-06-26 PROCEDURE — 77063 BREAST TOMOSYNTHESIS BI: CPT

## 2019-06-26 ASSESSMENT — ANXIETY QUESTIONNAIRES: GAD7 TOTAL SCORE: 0

## 2019-08-31 ENCOUNTER — OFFICE VISIT (OUTPATIENT)
Dept: URGENT CARE | Facility: URGENT CARE | Age: 59
End: 2019-08-31
Payer: COMMERCIAL

## 2019-08-31 ENCOUNTER — ANCILLARY PROCEDURE (OUTPATIENT)
Dept: GENERAL RADIOLOGY | Facility: CLINIC | Age: 59
End: 2019-08-31
Attending: PHYSICIAN ASSISTANT
Payer: COMMERCIAL

## 2019-08-31 VITALS
TEMPERATURE: 100.2 F | WEIGHT: 204 LBS | OXYGEN SATURATION: 97 % | SYSTOLIC BLOOD PRESSURE: 120 MMHG | DIASTOLIC BLOOD PRESSURE: 84 MMHG | BODY MASS INDEX: 31.95 KG/M2 | HEART RATE: 95 BPM

## 2019-08-31 DIAGNOSIS — R05.9 COUGH: ICD-10-CM

## 2019-08-31 DIAGNOSIS — J16.0 PNEUMONIA OF LEFT LOWER LOBE DUE TO CHLAMYDIA SPECIES: Primary | ICD-10-CM

## 2019-08-31 LAB
BASOPHILS # BLD AUTO: 0 10E9/L (ref 0–0.2)
BASOPHILS NFR BLD AUTO: 0.5 %
DIFFERENTIAL METHOD BLD: NORMAL
EOSINOPHIL # BLD AUTO: 0 10E9/L (ref 0–0.7)
EOSINOPHIL NFR BLD AUTO: 0.3 %
ERYTHROCYTE [DISTWIDTH] IN BLOOD BY AUTOMATED COUNT: 13.2 % (ref 10–15)
HCT VFR BLD AUTO: 39.5 % (ref 35–47)
HGB BLD-MCNC: 13.3 G/DL (ref 11.7–15.7)
LYMPHOCYTES # BLD AUTO: 1.1 10E9/L (ref 0.8–5.3)
LYMPHOCYTES NFR BLD AUTO: 13.4 %
MCH RBC QN AUTO: 31.6 PG (ref 26.5–33)
MCHC RBC AUTO-ENTMCNC: 33.7 G/DL (ref 31.5–36.5)
MCV RBC AUTO: 94 FL (ref 78–100)
MONOCYTES # BLD AUTO: 0.5 10E9/L (ref 0–1.3)
MONOCYTES NFR BLD AUTO: 6.5 %
NEUTROPHILS # BLD AUTO: 6.3 10E9/L (ref 1.6–8.3)
NEUTROPHILS NFR BLD AUTO: 79.3 %
PLATELET # BLD AUTO: 197 10E9/L (ref 150–450)
RBC # BLD AUTO: 4.21 10E12/L (ref 3.8–5.2)
WBC # BLD AUTO: 8 10E9/L (ref 4–11)

## 2019-08-31 PROCEDURE — 71046 X-RAY EXAM CHEST 2 VIEWS: CPT

## 2019-08-31 PROCEDURE — 36415 COLL VENOUS BLD VENIPUNCTURE: CPT | Performed by: PHYSICIAN ASSISTANT

## 2019-08-31 PROCEDURE — 99214 OFFICE O/P EST MOD 30 MIN: CPT | Performed by: PHYSICIAN ASSISTANT

## 2019-08-31 PROCEDURE — 85025 COMPLETE CBC W/AUTO DIFF WBC: CPT | Performed by: PHYSICIAN ASSISTANT

## 2019-08-31 RX ORDER — DOXYCYCLINE HYCLATE 100 MG
100 TABLET ORAL 2 TIMES DAILY
Qty: 20 TABLET | Refills: 0 | Status: SHIPPED | OUTPATIENT
Start: 2019-08-31 | End: 2020-07-28

## 2019-08-31 RX ORDER — BENZONATATE 200 MG/1
200 CAPSULE ORAL 3 TIMES DAILY PRN
Qty: 20 CAPSULE | Refills: 0 | Status: SHIPPED | OUTPATIENT
Start: 2019-08-31 | End: 2020-07-28

## 2019-08-31 NOTE — PROGRESS NOTES
SUBJECTIVE:  Ade Crowley is a 59 year old female who presents to the clinic today with a chief complaint of cough  for 2-3 day(s).  Her cough is described as persistent and productive of yellow sputum.    The patient's symptoms are moderate and worsening.  Associated symptoms include fever and body aches. The patient's symptoms are exacerbated by no particular triggers  Patient has been using OTC cough suppressants  to improve symptoms.  Denies fever/chills, HA, CP, pleuritic chest pain, SOB, hemoptysis, congestion, abd pain, N/V/D, rash, or any other symptoms. Patient denies history of DVT/PE, recent travel/surgery, tobacco use, leg pain or swelling, or history of cancer.    Past Medical History:   Diagnosis Date     Anesthesia complication     pt states with one of her C-sections the epidural didn't work and she needed general anesthesia     Depression      Infrequent menses     & heavy menses     SVT (supraventricular tachycardia) (H)     S/p ablation 12/10/15     WPW (Jaret-Parkinson-White syndrome) 1997       Current Outpatient Medications   Medication Sig Dispense Refill     buPROPion (WELLBUTRIN XL) 300 MG 24 hr tablet Take 1 tablet (300 mg) by mouth every morning DUE FOR AN APPT 90 tablet 3     citalopram (CELEXA) 10 MG tablet Take 1 tablet (10 mg) by mouth daily DUE FOR AN APPT 90 tablet 3       Social History     Tobacco Use     Smoking status: Former Smoker     Years: 20.00     Types: Cigarettes     Last attempt to quit: 2007     Years since quittin.0     Smokeless tobacco: Never Used     Tobacco comment: 10/3/2015   Substance Use Topics     Alcohol use: Yes     Alcohol/week: 0.0 oz     Comment: occasional beer/wine, few drinks every other weekend       ROS  Review of systems negative except as stated above.    OBJECTIVE:  /84   Pulse 95   Temp 100.2  F (37.9  C) (Oral)   Wt 92.5 kg (204 lb)   LMP 2013 (Approximate)   SpO2 97%   BMI 31.95 kg/m    GENERAL APPEARANCE:  healthy, alert and no distress  EYES: EOMI,  PERRL, conjunctiva clear  HENT: ear canals and TM's normal.  Nose and mouth without ulcers, erythema or lesions  NECK: supple, nontender, no lymphadenopathy  RESP: lungs clear to auscultation - no rales, rhonchi or wheezes  CV: regular rates and rhythm, normal S1 S2, no murmur noted  ABDOMEN:  soft, nontender  EXT: Neg Homans B/L  NEURO: Normal strength and tone, sensory exam grossly normal,  normal speech and mentation  SKIN: no suspicious lesions or rashes    CXR -- LLL infiltrate, pending radiology report    ASSESSMENT / PLAN:  1. Pneumonia of left lower lobe   59 year old female presents to the clinic for eval of cough. She has had recent exposure to pneumonia. On exam, lungs are ctab. No sign respiratory distress. No leg swelling, pleurisy or hemoptysis to suggest PE. CXR shows LLL infiltrate and atelectasis. Will treat for bacterial pneumonia. Encouraged fluids and rest.   RED FLAG symptoms discussed   - XR Chest 2 Views; Future  - CBC with platelets and differential  - doxycycline hyclate (VIBRA-TABS) 100 MG tablet; Take 1 tablet (100 mg) by mouth 2 times daily  Dispense: 20 tablet; Refill: 0  - benzonatate (TESSALON) 200 MG capsule; Take 1 capsule (200 mg) by mouth 3 times daily as needed for cough  Dispense: 20 capsule; Refill: 0    Diagnosis and treatment plan was reviewed with patient and/or family.   We went over any labs or imaging. Discussed worsening symptoms or little to no relief despite treatment plan to follow-up with PCP or return to clinic.  Patient verbalizes understanding. All questions were addressed and answered.   Molly Dodd PA-C

## 2019-08-31 NOTE — PATIENT INSTRUCTIONS
Patient Education     Pneumonia (Adult)  Pneumonia is an infection deep within the lungs. It is in the small air sacs (alveoli). Pneumonia may be caused by a virus or bacteria. Pneumonia caused by bacteria is usually treated with an antibiotic. Severe cases may need to be treated in the hospital. Milder cases can be treated at home. Symptoms usually start to get better during the first 2 days of treatment.    Home care  Follow these guidelines when caring for yourself at home:    Rest at home for the first 2 to 3 days, or until you feel stronger. Don t let yourself get overly tired when you go back to your activities.    Stay away from cigarette smoke - yours or other people s.    You may use acetaminophen or ibuprofen to control fever or pain, unless another medicine was prescribed. If you have chronic liver or kidney disease, talk with your healthcare provider before using these medicines. Also talk with your provider if you ve had a stomach ulcer or gastrointestinal bleeding. Don t give aspirin to anyone younger than 18 years of age who is ill with a fever. It may cause severe liver damage.    Your appetite may be poor, so a light diet is fine.    Drink 6 to 8 glasses of fluids every day to make sure you are getting enough fluids. Beverages can include water, sport drinks, sodas without caffeine, juices, tea, or soup. Fluids will help loosen secretions in the lung. This will make it easier for you to cough up the phlegm (sputum). If you also have heart or kidney disease, check with your healthcare provider before you drink extra fluids.    Take antibiotic medicine prescribed until it is all gone, even if you are feeling better after a few days.  Follow-up care  Follow up with your healthcare provider in the next 2 to 3 days, or as advised. This is to be sure the medicine is helping you get better.  If you are 65 or older, you should get a pneumococcal vaccine and a yearly flu (influenza) shot. You should also  get these vaccines if you have chronic lung disease like asthma, emphysema, or COPD. Recently, a second type of pneumonia vaccine has become available for everyone over 65 years old. This is in addition to the previous vaccine. Ask your provider about this.  When to seek medical advice  Call your healthcare provider right away if any of these occur:    You don t get better within the first 48 hours of treatment    Shortness of breath gets worse    Rapid breathing (more than 25 breaths per minute)    Coughing up blood    Chest pain gets worse with breathing    Fever of 100.4 F (38 C) or higher that doesn t get better with fever medicine    Weakness, dizziness, or fainting that gets worse    Thirst or dry mouth that gets worse    Sinus pain, headache, or a stiff neck    Chest pain not caused by coughing  Date Last Reviewed: 1/1/2017 2000-2018 [employer]. [address] [place] [address], [place] [address]. All rights reserved. This information is not intended as a substitute for professional medical care. Always follow your healthcare professional's instructions.

## 2019-09-06 ENCOUNTER — OFFICE VISIT (OUTPATIENT)
Dept: DERMATOLOGY | Facility: CLINIC | Age: 59
End: 2019-09-06
Payer: COMMERCIAL

## 2019-09-06 VITALS — HEART RATE: 72 BPM | SYSTOLIC BLOOD PRESSURE: 112 MMHG | DIASTOLIC BLOOD PRESSURE: 80 MMHG | OXYGEN SATURATION: 97 %

## 2019-09-06 DIAGNOSIS — L81.4 LENTIGO: ICD-10-CM

## 2019-09-06 DIAGNOSIS — D18.01 ANGIOMA OF SKIN: ICD-10-CM

## 2019-09-06 DIAGNOSIS — L82.1 SEBORRHEIC KERATOSIS: ICD-10-CM

## 2019-09-06 DIAGNOSIS — D23.9 DERMATOFIBROMA: ICD-10-CM

## 2019-09-06 DIAGNOSIS — L72.0 EIC (EPIDERMAL INCLUSION CYST): ICD-10-CM

## 2019-09-06 DIAGNOSIS — D22.9 NEVUS: ICD-10-CM

## 2019-09-06 DIAGNOSIS — D48.5 NEOPLASM OF UNCERTAIN BEHAVIOR OF SKIN: Primary | ICD-10-CM

## 2019-09-06 PROCEDURE — 99204 OFFICE O/P NEW MOD 45 MIN: CPT | Mod: 25 | Performed by: PHYSICIAN ASSISTANT

## 2019-09-06 PROCEDURE — 88305 TISSUE EXAM BY PATHOLOGIST: CPT | Mod: TC | Performed by: PHYSICIAN ASSISTANT

## 2019-09-06 PROCEDURE — 11102 TANGNTL BX SKIN SINGLE LES: CPT | Performed by: PHYSICIAN ASSISTANT

## 2019-09-06 NOTE — PATIENT INSTRUCTIONS
Wound Care Instructions     FOR SUPERFICIAL WOUNDS     [facility] [phone]    [facility] [phone]                       AFTER 24 HOURS YOU SHOULD REMOVE THE BANDAGE AND BEGIN DAILY DRESSING CHANGES AS FOLLOWS:     1) Remove Dressing.     2) Clean and dry the area with tap water using a Q-tip or sterile gauze pad.     3) Apply Vaseline, Aquaphor, Polysporin ointment or Bacitracin ointment over entire wound.  Do NOT use Neosporin ointment.     4) Cover the wound with a band-aid, or a sterile non-stick gauze pad and micropore paper tape      REPEAT THESE INSTRUCTIONS AT LEAST ONCE A DAY UNTIL THE WOUND HAS COMPLETELY HEALED.    It is an old wives tale that a wound heals better when it is exposed to air and allowed to dry out. The wound will heal faster with a better cosmetic result if it is kept moist with ointment and covered with a bandage.    **Do not let the wound dry out.**      Supplies Needed:      *Cotton tipped applicators (Q-tips)    *Polysporin Ointment or Bacitracin Ointment (NOT NEOSPORIN)    *Band-aids or non-stick gauze pads and micropore paper tape.      PATIENT INFORMATION:    During the healing process you will notice a number of changes. All wounds develop a small halo of redness surrounding the wound.  This means healing is occurring. Severe itching with extensive redness usually indicates sensitivity to the ointment or bandage tape used to dress the wound.  You should call our office if this develops.      Swelling  and/or discoloration around your surgical site is common, particularly when performed around the eye.    All wounds normally drain.  The larger the wound the more drainage there will be.  After 7-10 days, you will notice the wound beginning to shrink and new skin will begin to grow.  The wound is healed when you can see skin has formed over the entire area.  A healed wound has a healthy, shiny look to the surface and is red to dark pink in color  to normalize.  Wounds may take approximately 4-6 weeks to heal.  Larger wounds may take 6-8 weeks.  After the wound is healed you may discontinue dressing changes.    You may experience a sensation of tightness as your wound heals. This is normal and will gradually subside.    Your healed wound may be sensitive to temperature changes. This sensitivity improves with time, but if you re having a lot of discomfort, try to avoid temperature extremes.    Patients frequently experience itching after their wound appears to have healed because of the continue healing under the skin.  Plain Vaseline will help relieve the itching.        POSSIBLE COMPLICATIONS    BLEEDIN. Leave the bandage in place.  2. Use tightly rolled up gauze or a cloth to apply direct pressure over the bandage for 30  minutes.  3. Reapply pressure for an additional 30 minutes if necessary  4. Use additional gauze and tape to maintain pressure once the bleeding has stopped.

## 2019-09-06 NOTE — PROGRESS NOTES
"HPI:   Chief complaints: Ade Crowley is a 59 year old female who presents for Full skin cancer screening to rule out skin cancer   Last Skin Exam: none      1st Baseline: YES  Personal HX of Skin Cancer: none   Personal HX of Malignant Melanoma: none   Family HX of Skin Cancer / Malignant Melanoma: mother with NMSC  Personal HX of Atypical Moles: none  Risk factors: sun exposure, frequent sun exposure in her youth  New / Changing lesions: none  Additional concern: \"getting over\" a rash that was on her armpits. Applied hydrocortisone with improvement.   Social History: enjoys gardening  On review of systems, there are no further skin complaints, patient is feeling otherwise well.  See patient intake sheet.  ROS of the following were done and are negative: Constitutional, Eyes, Ears, Nose,   Mouth, Throat, Cardiovascular, Respiratory, GI, Genitourinary, Musculoskeletal,   Psychiatric, Endocrine, Allergic/Immunologic.    This document serves as a record of the services and decisions personally performed and made by Nettie Gutierrez, MS, PA-C. It was created on her behalf by Susana Wei, a trained medical scribe. The creation of this document is based on the provider's statements to the medical scribe.  Susana Wei 3:02 PM September 6, 2019    PHYSICAL EXAM:   /80   Pulse 72   LMP 03/23/2013 (Approximate)   SpO2 97%   Skin exam performed as follows: Type 2 skin. Mood appropriate  Alert and Oriented X 3. Well developed, well nourished in no distress.  General appearance: Normal  Head including face: Normal  Eyes: conjunctiva and lids: Normal  Mouth: Lips, teeth, gums: Normal  Neck: Normal  Chest-breast/axillae: Normal  Back: Normal  Spleen and liver: Normal  Cardiovascular: Exam of peripheral vascular system by observation for swelling, varicosities, edema: Normal  Genitalia: groin, buttocks: Normal  Extremities: digits/nails (clubbing): Normal  Eccrine and Apocrine glands: " Normal  Right upper extremity: Normal  Left upper extremity: Normal  Right lower extremity: Normal  Left lower extremity: Normal  Skin: Scalp and body hair: See below    Pt deferred exam of breasts, groin, buttocks: No    Other physical findings:  1. Multiple pigmented macules on extremities and trunk  2. Multiple pigmented macules on face, trunk and extremities  3. Multiple vascular papules on trunk, arms and legs  4. Multiple scattered keratotic plaques  5. Firm papule with dimple sign on bilateral lower legs  6. 2 cm smooth subcutaneous nodule on right trapezius  7. 5 mm pink brown macule on left elbow         Except as noted above, no other signs of skin cancer or melanoma.     ASSESSMENT/PLAN:   Benign Full skin cancer screening today.     Patient with history of none  Advised on monthly self exams and 1 year  Patient Education: Appropriate brochures given.    1. Multiple benign appearing nevi on arms, legs and trunk. Discussed ABCDEs of melanoma and sunscreen.   2. Multiple lentigos on arms, legs and trunk. Advised benign, no treatment needed.  3. Multiple scattered angiomas. Advised benign, no treatment needed.   4. Seborrheic keratosis on arms, legs and trunk. Advised benign, no treatment needed.  5. Dermatofibroma on bilateral lower legs. Advised benign no treatment needed.   6. Epidermal cyst on the right trapezius- advised benign and no treatment needed. Discussed excision with Dr. Mai if bothersome.  7. R/o atypical nevus on left elbow. Photo taken and placed in chart. Incisional biopsy in typical fashion .  Area cleaned with betadyne and anesthetized with 1% lidocaine with epi .  Dermablade used to remove the lesion and sent to pathology. Bleeding was cauterized. Pt tolerated procedure well.  8. Dermatitis on bilateral arms. Currently using hydrocortisone with improvement. Advised to continue with hydrocortisone. If not improved may call for stronger cream.        Follow-up: pending path/yearly  FSE/PRN sooner    1.) Patient was asked about new and changing moles. YES  2.) Patient received a complete physical skin examination: YES  3.) Patient was counseled to perform a monthly self skin examination: YES  Scribed By: Susana Wei, Medical Scribe    The information in this document, created by the medical scribe for me, accurately reflects the services I personally performed and the decisions made by me. I have reviewed and approved this document for accuracy prior to leaving the patient care area.  September 6, 2019 3:13 PM    Nettie Gutierrez MS, PA-C

## 2019-09-06 NOTE — LETTER
"    9/6/2019         RE: Ade Crowley  [id number] [address] [facility] [id number]        Dear Colleague,    Thank you for referring your patient, Ade Crowley, to the [facility]. Please see a copy of my visit note below.    HPI:   Chief complaints: Ade Crowley is a 59 year old female who presents for Full skin cancer screening to rule out skin cancer   Last Skin Exam: none      1st Baseline: YES  Personal HX of Skin Cancer: none   Personal HX of Malignant Melanoma: none   Family HX of Skin Cancer / Malignant Melanoma: mother with NMSC  Personal HX of Atypical Moles: none  Risk factors: sun exposure, frequent sun exposure in her youth  New / Changing lesions: none  Additional concern: \"getting over\" a rash that was on her armpits. Applied hydrocortisone with improvement.   Social History: enjoys gardening  On review of systems, there are no further skin complaints, patient is feeling otherwise well.  See patient intake sheet.  ROS of the following were done and are negative: Constitutional, Eyes, Ears, Nose,   Mouth, Throat, Cardiovascular, Respiratory, GI, Genitourinary, Musculoskeletal,   Psychiatric, Endocrine, Allergic/Immunologic.    This document serves as a record of the services and decisions personally performed and made by Nettie Gutierrez, MS, PA-C. It was created on her behalf by Susana Wei, a trained medical scribe. The creation of this document is based on the provider's statements to the medical scribe.  Susana Wei 3:02 PM September 6, 2019    PHYSICAL EXAM:   /80   Pulse 72   LMP 03/23/2013 (Approximate)   SpO2 97%   Skin exam performed as follows: Type 2 skin. Mood appropriate  Alert and Oriented X 3. Well developed, well nourished in no distress.  General appearance: Normal  Head including face: Normal  Eyes: conjunctiva and lids: Normal  Mouth: Lips, teeth, gums: Normal  Neck: Normal  Chest-breast/axillae: Normal  Back: " Normal  Spleen and liver: Normal  Cardiovascular: Exam of peripheral vascular system by observation for swelling, varicosities, edema: Normal  Genitalia: groin, buttocks: Normal  Extremities: digits/nails (clubbing): Normal  Eccrine and Apocrine glands: Normal  Right upper extremity: Normal  Left upper extremity: Normal  Right lower extremity: Normal  Left lower extremity: Normal  Skin: Scalp and body hair: See below    Pt deferred exam of breasts, groin, buttocks: No    Other physical findings:  1. Multiple pigmented macules on extremities and trunk  2. Multiple pigmented macules on face, trunk and extremities  3. Multiple vascular papules on trunk, arms and legs  4. Multiple scattered keratotic plaques  5. Firm papule with dimple sign on bilateral lower legs  6. 2 cm smooth subcutaneous nodule on right trapezius  7. 5 mm pink brown macule on left elbow         Except as noted above, no other signs of skin cancer or melanoma.     ASSESSMENT/PLAN:   Benign Full skin cancer screening today.     Patient with history of none  Advised on monthly self exams and 1 year  Patient Education: Appropriate brochures given.    1. Multiple benign appearing nevi on arms, legs and trunk. Discussed ABCDEs of melanoma and sunscreen.   2. Multiple lentigos on arms, legs and trunk. Advised benign, no treatment needed.  3. Multiple scattered angiomas. Advised benign, no treatment needed.   4. Seborrheic keratosis on arms, legs and trunk. Advised benign, no treatment needed.  5. Dermatofibroma on bilateral lower legs. Advised benign no treatment needed.   6. Epidermal cyst on the right trapezius- advised benign and no treatment needed. Discussed excision with Dr. Mai if bothersome.  7. R/o atypical nevus on left elbow. Photo taken and placed in chart. Shave bx in typical fashion .  Area cleaned with betadyne and anesthetized with 1% lidocaine with epi .  Dermablade used to remove the lesion and sent to pathology. Bleeding was  cauterized. Pt tolerated procedure well.  8. Dermatitis on bilateral arms. Currently using hydrocortisone with improvement. Advised to continue with hydrocortisone. If not improved may call for stronger cream.        Follow-up: pending path/yearly FSE/PRN sooner    1.) Patient was asked about new and changing moles. YES  2.) Patient received a complete physical skin examination: YES  3.) Patient was counseled to perform a monthly self skin examination: YES  Scribed By: Susana Wei, Medical Scribe    The information in this document, created by the medical scribe for me, accurately reflects the services I personally performed and the decisions made by me. I have reviewed and approved this document for accuracy prior to leaving the patient care area.  September 6, 2019 3:13 PM    Nettie Gutierrez MS, PALAUREEN        Again, thank you for allowing me to participate in the care of your patient.        Sincerely,        Nettie Gutierrez PA-C

## 2019-09-10 LAB — COPATH REPORT: NORMAL

## 2019-09-30 ENCOUNTER — HEALTH MAINTENANCE LETTER (OUTPATIENT)
Age: 59
End: 2019-09-30

## 2019-10-29 ENCOUNTER — HEALTH MAINTENANCE LETTER (OUTPATIENT)
Age: 59
End: 2019-10-29

## 2020-03-15 ENCOUNTER — HEALTH MAINTENANCE LETTER (OUTPATIENT)
Age: 60
End: 2020-03-15

## 2020-07-08 DIAGNOSIS — F43.0 ACUTE REACTION TO STRESS: ICD-10-CM

## 2020-07-08 DIAGNOSIS — F32.0 MAJOR DEPRESSIVE DISORDER, SINGLE EPISODE, MILD (H): ICD-10-CM

## 2020-07-09 ENCOUNTER — MYC MEDICAL ADVICE (OUTPATIENT)
Dept: FAMILY MEDICINE | Facility: CLINIC | Age: 60
End: 2020-07-09

## 2020-07-09 NOTE — TELEPHONE ENCOUNTER
Routing refill request to provider for review/approval because:      SSRIs Protocol [id number]/08/2020 01:37 PM   PHQ-9 score less than 5 in past 6 months Protocol Details    Recent (6 mo) or future (30 days) visit within the authorizing provider's specialty      PHQ-9 and GAD7 Scores 6/25/2018 6/25/2019   PHQ9 TOTAL SCORE     PHQ-9 Total Score 1 2   PAMELA-7 Total Score 1 0       Patient needs to be scheduled for a visit due to last visit on 6/25/2019.     Yamileth Flanagan RN Flex

## 2020-07-15 ENCOUNTER — MYC MEDICAL ADVICE (OUTPATIENT)
Dept: FAMILY MEDICINE | Facility: CLINIC | Age: 60
End: 2020-07-15

## 2020-07-15 RX ORDER — CITALOPRAM HYDROBROMIDE 10 MG/1
10 TABLET ORAL DAILY
Qty: 30 TABLET | Refills: 0 | Status: SHIPPED | OUTPATIENT
Start: 2020-07-15 | End: 2020-07-28

## 2020-07-15 RX ORDER — BUPROPION HYDROCHLORIDE 300 MG/1
300 TABLET ORAL EVERY MORNING
Qty: 30 TABLET | Refills: 0 | Status: SHIPPED | OUTPATIENT
Start: 2020-07-15 | End: 2020-07-28

## 2020-07-15 ASSESSMENT — PATIENT HEALTH QUESTIONNAIRE - PHQ9
SUM OF ALL RESPONSES TO PHQ QUESTIONS 1-9: 1
SUM OF ALL RESPONSES TO PHQ QUESTIONS 1-9: 1
10. IF YOU CHECKED OFF ANY PROBLEMS, HOW DIFFICULT HAVE THESE PROBLEMS MADE IT FOR YOU TO DO YOUR WORK, TAKE CARE OF THINGS AT HOME, OR GET ALONG WITH OTHER PEOPLE: NOT DIFFICULT AT ALL

## 2020-07-15 ASSESSMENT — ANXIETY QUESTIONNAIRES
GAD7 TOTAL SCORE: 6
2. NOT BEING ABLE TO STOP OR CONTROL WORRYING: SEVERAL DAYS
5. BEING SO RESTLESS THAT IT IS HARD TO SIT STILL: NOT AT ALL
4. TROUBLE RELAXING: SEVERAL DAYS
1. FEELING NERVOUS, ANXIOUS, OR ON EDGE: SEVERAL DAYS
3. WORRYING TOO MUCH ABOUT DIFFERENT THINGS: SEVERAL DAYS
7. FEELING AFRAID AS IF SOMETHING AWFUL MIGHT HAPPEN: SEVERAL DAYS
GAD7 TOTAL SCORE: 6
GAD7 TOTAL SCORE: 6
7. FEELING AFRAID AS IF SOMETHING AWFUL MIGHT HAPPEN: SEVERAL DAYS
6. BECOMING EASILY ANNOYED OR IRRITABLE: SEVERAL DAYS

## 2020-07-15 NOTE — TELEPHONE ENCOUNTER
Pt overdue for appt and mental health form assessment.   Pt notified last week. No appt on file.  Will extend Rx for ONE 30 days supply. Please assist with scheduling.     Verona Johnson MD  Internal Medicine  electronically signed

## 2020-07-16 ASSESSMENT — ANXIETY QUESTIONNAIRES: GAD7 TOTAL SCORE: 6

## 2020-07-16 ASSESSMENT — PATIENT HEALTH QUESTIONNAIRE - PHQ9: SUM OF ALL RESPONSES TO PHQ QUESTIONS 1-9: 1

## 2020-07-28 ENCOUNTER — VIRTUAL VISIT (OUTPATIENT)
Dept: FAMILY MEDICINE | Facility: CLINIC | Age: 60
End: 2020-07-28
Payer: COMMERCIAL

## 2020-07-28 DIAGNOSIS — F32.0 MAJOR DEPRESSIVE DISORDER, SINGLE EPISODE, MILD (H): ICD-10-CM

## 2020-07-28 DIAGNOSIS — F43.0 ACUTE REACTION TO STRESS: ICD-10-CM

## 2020-07-28 PROCEDURE — 99214 OFFICE O/P EST MOD 30 MIN: CPT | Mod: 95 | Performed by: INTERNAL MEDICINE

## 2020-07-28 RX ORDER — BUPROPION HYDROCHLORIDE 300 MG/1
300 TABLET ORAL EVERY MORNING
Qty: 90 TABLET | Refills: 3 | Status: SHIPPED | OUTPATIENT
Start: 2020-07-28 | End: 2021-08-25

## 2020-07-28 RX ORDER — CITALOPRAM HYDROBROMIDE 10 MG/1
10 TABLET ORAL DAILY
Qty: 90 TABLET | Refills: 3 | Status: SHIPPED | OUTPATIENT
Start: 2020-07-28 | End: 2021-08-23

## 2020-07-28 NOTE — PROGRESS NOTES
"Ade Crowley is a 59 year old female who is being evaluated via a billable video visit.      Doximity visit [phone]    The patient has been notified of following:     \"This video visit will be conducted via a call between you and your physician/provider. We have found that certain health care needs can be provided without the need for an in-person physical exam.  This service lets us provide the care you need with a video conversation.  If a prescription is necessary we can send it directly to your pharmacy.  If lab work is needed we can place an order for that and you can then stop by our lab to have the test done at a later time.    Video visits are billed at different rates depending on your insurance coverage.  Please reach out to your insurance provider with any questions.    If during the course of the call the physician/provider feels a video visit is not appropriate, you will not be charged for this service.\"    Patient has given verbal consent for Video visit? Yes  How would you like to obtain your AVS? MyChart  If you are dropped from the video visit, the video invite should be resent to: Text to cell phone: [phone]  Will anyone else be joining your video visit? No    Subjective     Ade Crowley is a 59 year old female who presents today via video visit for the following health issues:    HPI    Depression and Anxiety Follow-Up    How are you doing with your depression since your last visit? stable    How are you doing with your anxiety since your last visit?  No change    Are you having other symptoms that might be associated with depression or anxiety? No    Have you had a significant life event? No     Do you have any concerns with your use of alcohol or other drugs? No    Social History     Tobacco Use     Smoking status: Former Smoker     Years: 20.00     Types: Cigarettes     Last attempt to quit: 2007     Years since quittin.9     Smokeless tobacco: Never Used     Tobacco " comment: 10/3/2015   Substance Use Topics     Alcohol use: Yes     Alcohol/week: 0.0 standard drinks     Comment: occasional beer/wine, few drinks every other weekend     Drug use: No     PHQ 6/25/2018 6/25/2019 7/15/2020   PHQ-9 Total Score 1 2 1   Q9: Thoughts of better off dead/self-harm past 2 weeks Not at all Not at all Not at all     PAMELA-7 SCORE 6/25/2018 6/25/2019 7/15/2020   Total Score - - -   Total Score - - 6 (mild anxiety)   Total Score 1 0 6     Last PHQ-9 7/15/2020   1.  Little interest or pleasure in doing things 0   2.  Feeling down, depressed, or hopeless 0   3.  Trouble falling or staying asleep, or sleeping too much 0   4.  Feeling tired or having little energy 0   5.  Poor appetite or overeating 0   6.  Feeling bad about yourself 0   7.  Trouble concentrating 1   8.  Moving slowly or restless 0   Q9: Thoughts of better off dead/self-harm past 2 weeks 0   PHQ-9 Total Score 1   Difficulty at work, home, or with people -     PAMELA-7  7/15/2020   1. Feeling nervous, anxious, or on edge 1   2. Not being able to stop or control worrying 1   3. Worrying too much about different things 1   4. Trouble relaxing 1   5. Being so restless that it is hard to sit still 0   6. Becoming easily annoyed or irritable 1   7. Feeling afraid, as if something awful might happen 1   PAMELA-7 Total Score 6   If you checked any problems, how difficult have they made it for you to do your work, take care of things at home, or get along with other people? -       Suicide Assessment Five-step Evaluation and Treatment (SAFE-T)      How many servings of fruits and vegetables do you eat daily?  2-3    On average, how many sweetened beverages do you drink each day (Examples: soda, juice, sweet tea, etc.  Do NOT count diet or artificially sweetened beverages)?   0      How many days per week do you miss taking your medication? 0      Patient Active Problem List   Diagnosis     Pt reports Del Cid Parkinson White     Family history of  malignant neoplasm of breast     Menorrhagia     CARDIOVASCULAR SCREENING; LDL GOAL LESS THAN 160     Lumbago     Nonallopathic lesion of sacral region     Pain in joint, pelvic region and thigh     Nonallopathic lesion of lower extremities     Mild major depression (H)     S/P hysterectomy     Special screening for malignant neoplasms, colon     Syncope     Jaret-Parkinson-White (WPW) syndrome     Vasovagal syncope     SVT (supraventricular tachycardia) (H)     Major depressive disorder, single episode, mild (H)     Past Surgical History:   Procedure Laterality Date     APPENDECTOMY       C NONSPECIFIC PROCEDURE      Tubal ligation     C NONSPECIFIC PROCEDURE      C-sections x 3     C NONSPECIFIC PROCEDURE      left breast biopsy--benign      SECTION  x3     COLONOSCOPY N/A 2014    Procedure: COMBINED COLONOSCOPY, SINGLE OR MULTIPLE BIOPSY/POLYPECTOMY BY BIOPSY;  Surgeon: Jesus Adler MD;  Location: [facility]     DAVINCI HYSTERECTOMY SUPRACERVICAL  2013    Procedure: DAVINCI HYSTERECTOMY SUPRACERVICAL;  Frozen section dilation & curretage. Robotic Assisted  Laparoscopic Supracervical Hysterectomy, Bilateral Salpingectomy, Cystoscopy ;  Surgeon: Sophia Jiménez DO;  Location: [facility] OR     DILATION AND CURETTAGE  2013    Procedure: DILATION AND CURETTAGE;;  Surgeon: Sophia Jiménez DO;  Location: [facility] OR     H ABLATION SVT  12/10/2015    WPW     HC REMOVAL OF TONSILS,12+ Y/O         Social History     Tobacco Use     Smoking status: Former Smoker     Years: 20.00     Types: Cigarettes     Last attempt to quit: 2007     Years since quittin.9     Smokeless tobacco: Never Used     Tobacco comment: 10/3/2015   Substance Use Topics     Alcohol use: Yes     Alcohol/week: 0.0 standard drinks     Comment: occasional beer/wine, few drinks every other weekend     Family History   Problem Relation Age of Onset     Arthritis Mother      Cancer Mother         breast cancer stage 4  original [id number] recurred 2 years ago/skin cancer bcc     Gynecology Mother         uterine tumor with hysterectomy     Thyroid Disease Mother         hypothyroid     Breast Cancer Mother      Cardiovascular Father         chf  of heart attack age 68     Diabetes Daughter         type one diabetes   diagnosed age 9     Cancer Maternal Grandmother         bone cancer         Current Outpatient Medications   Medication Sig Dispense Refill     buPROPion (WELLBUTRIN XL) 300 MG 24 hr tablet Take 1 tablet (300 mg) by mouth every morning 90 tablet 3     citalopram (CELEXA) 10 MG tablet Take 1 tablet (10 mg) by mouth daily 90 tablet 3     Allergies   Allergen Reactions     No Known Drug Allergies      No Clinical Screening - See Comments Rash     Unable to find: Secret Clear deodorant - rash      Recent Labs   Lab Test 18  0832 16  0812  10/03/15  1620 06/03/15  1016   LDL 96 107*  --   --  98   HDL 37* 42*  --   --  47*   TRIG 239* 130  --   --  87   ALT 29 19  --  28 26   CR 0.79 0.78   < > 0.74 0.74   GFRESTIMATED 75 77   < > 82 82   GFRESTBLACK >90 >90  African American GFR Calc     < > >90   GFR Calc   >90   GFR Calc     POTASSIUM 4.7 4.0   < > 3.5 4.3   TSH 2.95 2.29  --  2.81 2.15    < > = values in this interval not displayed.      BP Readings from Last 3 Encounters:   19 112/80   19 120/84   19 118/84    Wt Readings from Last 3 Encounters:   19 92.5 kg (204 lb)   19 92.5 kg (203 lb 14.4 oz)   18 90.4 kg (199 lb 6.4 oz)             Reviewed and updated as needed this visit by Provider  Tobacco  Allergies  Meds  Problems  Med Hx  Surg Hx  Fam Hx         Review of Systems   CONSTITUTIONAL: NEGATIVE for fever, chills, change in weight  EYES: NEGATIVE for vision changes or irritation  ENT/MOUTH: NEGATIVE for ear, mouth and throat problems  RESP: NEGATIVE for significant cough or SOB  CV: NEGATIVE for chest pain, palpitations  or peripheral edema  NEURO: NEGATIVE for weakness, dizziness or paresthesias  PSYCHIATRIC: anxiety and depression; meds helpful;  7 people currently living and working from home including: Pt/spouse, adult daughter moved home from [place], daughter and 3 grandchildren ( ages 6/9/11); latter group moving out soon.       Objective         Physical Exam     GENERAL: Healthy, alert and no distress  EYES: Eyes grossly normal to inspection.  No discharge or erythema, or obvious scleral/conjunctival abnormalities.  NECK: No asymmetry, visible masses or scars  RESP: No audible wheeze, cough, or visible cyanosis.  No visible retractions or increased work of breathing.    MS: extremities normal- no gross deformities noted  SKIN: Visible skin clear. No significant rash, abnormal pigmentation or lesions.  NEURO: Cranial nerves grossly intact.  Mentation and speech appropriate for age.  PSYCH: Mentation appears normal, affect normal/bright, judgement and insight intact, normal speech and appearance well-groomed.      Diagnostic Test Results:  Labs reviewed in Epic        Assessment & Plan     (F32.0) Major depressive disorder, single episode, mild (H)  Comment:   PHQ 6/25/2018 6/25/2019 7/15/2020   PHQ-9 Total Score 1 2 1   Q9: Thoughts of better off dead/self-harm past 2 weeks Not at all Not at all Not at all   coping well  Plan: buPROPion (WELLBUTRIN XL) 300 MG 24 hr tablet,         citalopram (CELEXA) 10 MG tablet        No changes recommended at this time    (F43.0) Acute reaction to stress  Comment: continue Wellbutrin, Citalopram   PAMELA-7 SCORE 6/25/2018 6/25/2019 7/15/2020   Total Score - - -   Total Score - - 6 (mild anxiety)   Total Score 1 0 6   discussed increased stressors; related to COVID changes, more family members in living space; helping with grandchildren while also working from home.   Plan: citalopram (CELEXA) 10 MG tablet        Agree with plan to continue same medications; acknowledge stressors;  encouraged to make time for self in these challenging times.          MEDICATIONS:  Continue current medications without change  Regular exercise encouraged.    Return in about 1 month (around 8/28/2020) for Physical Exam- preventive visit encouraged. .        Video-Visit Details    Type of service:  Video Visit    Video Start Time: 8:59 AM    Video End Time:9:15 AM        Originating Location (pt. Location): Home    Distant Location (provider location):  [facility]     Platform used for Video Visit: Doximity    Return in about 1 month (around 8/28/2020) for Physical Exam- preventive visit encouraged. .       Verona Johnson MD  Internal Medicine  electronically signed   [facility]

## 2021-01-15 ENCOUNTER — HEALTH MAINTENANCE LETTER (OUTPATIENT)
Age: 61
End: 2021-01-15

## 2021-03-19 ENCOUNTER — HOSPITAL ENCOUNTER (OUTPATIENT)
Dept: MAMMOGRAPHY | Facility: CLINIC | Age: 61
Discharge: HOME | End: 2021-03-19
Attending: INTERNAL MEDICINE | Admitting: INTERNAL MEDICINE
Payer: COMMERCIAL

## 2021-03-19 DIAGNOSIS — Z12.31 VISIT FOR SCREENING MAMMOGRAM: ICD-10-CM

## 2021-03-19 PROCEDURE — 77063 BREAST TOMOSYNTHESIS BI: CPT

## 2021-04-22 ENCOUNTER — OFFICE VISIT (OUTPATIENT)
Dept: OBGYN | Facility: CLINIC | Age: 61
End: 2021-04-22
Payer: COMMERCIAL

## 2021-04-22 VITALS
DIASTOLIC BLOOD PRESSURE: 70 MMHG | WEIGHT: 206.2 LBS | BODY MASS INDEX: 32.36 KG/M2 | HEIGHT: 67 IN | SYSTOLIC BLOOD PRESSURE: 116 MMHG

## 2021-04-22 DIAGNOSIS — Z01.419 ENCOUNTER FOR GYNECOLOGICAL EXAMINATION (GENERAL) (ROUTINE) WITHOUT ABNORMAL FINDINGS: Primary | ICD-10-CM

## 2021-04-22 LAB
ALBUMIN SERPL-MCNC: 3.9 G/DL (ref 3.4–5)
ALP SERPL-CCNC: 101 U/L (ref 40–150)
ALT SERPL W P-5'-P-CCNC: 20 U/L (ref 0–50)
ANION GAP SERPL CALCULATED.3IONS-SCNC: 3 MMOL/L (ref 3–14)
AST SERPL W P-5'-P-CCNC: 13 U/L (ref 0–45)
BILIRUB SERPL-MCNC: 0.4 MG/DL (ref 0.2–1.3)
BUN SERPL-MCNC: 18 MG/DL (ref 7–30)
CALCIUM SERPL-MCNC: 9 MG/DL (ref 8.5–10.1)
CHLORIDE SERPL-SCNC: 106 MMOL/L (ref 94–109)
CHOLEST SERPL-MCNC: 214 MG/DL
CO2 SERPL-SCNC: 29 MMOL/L (ref 20–32)
CREAT SERPL-MCNC: 0.86 MG/DL (ref 0.52–1.04)
ERYTHROCYTE [DISTWIDTH] IN BLOOD BY AUTOMATED COUNT: 13.1 % (ref 10–15)
GFR SERPL CREATININE-BSD FRML MDRD: 73 ML/MIN/{1.73_M2}
GLUCOSE SERPL-MCNC: 92 MG/DL (ref 70–99)
HCT VFR BLD AUTO: 41 % (ref 35–47)
HDLC SERPL-MCNC: 47 MG/DL
HGB BLD-MCNC: 13.3 G/DL (ref 11.7–15.7)
LDLC SERPL CALC-MCNC: 129 MG/DL
MCH RBC QN AUTO: 30.2 PG (ref 26.5–33)
MCHC RBC AUTO-ENTMCNC: 32.4 G/DL (ref 31.5–36.5)
MCV RBC AUTO: 93 FL (ref 78–100)
NONHDLC SERPL-MCNC: 167 MG/DL
PLATELET # BLD AUTO: 266 10E9/L (ref 150–450)
POTASSIUM SERPL-SCNC: 4 MMOL/L (ref 3.4–5.3)
PROT SERPL-MCNC: 6.9 G/DL (ref 6.8–8.8)
RBC # BLD AUTO: 4.4 10E12/L (ref 3.8–5.2)
SODIUM SERPL-SCNC: 138 MMOL/L (ref 133–144)
TRIGL SERPL-MCNC: 192 MG/DL
TSH SERPL DL<=0.005 MIU/L-ACNC: 2.98 MU/L (ref 0.4–4)
WBC # BLD AUTO: 6.7 10E9/L (ref 4–11)

## 2021-04-22 PROCEDURE — 80053 COMPREHEN METABOLIC PANEL: CPT | Performed by: FAMILY MEDICINE

## 2021-04-22 PROCEDURE — 84443 ASSAY THYROID STIM HORMONE: CPT | Performed by: FAMILY MEDICINE

## 2021-04-22 PROCEDURE — G0145 SCR C/V CYTO,THINLAYER,RESCR: HCPCS | Performed by: FAMILY MEDICINE

## 2021-04-22 PROCEDURE — 85027 COMPLETE CBC AUTOMATED: CPT | Performed by: FAMILY MEDICINE

## 2021-04-22 PROCEDURE — 99396 PREV VISIT EST AGE 40-64: CPT | Performed by: FAMILY MEDICINE

## 2021-04-22 PROCEDURE — 80061 LIPID PANEL: CPT | Performed by: FAMILY MEDICINE

## 2021-04-22 PROCEDURE — 87624 HPV HI-RISK TYP POOLED RSLT: CPT | Performed by: FAMILY MEDICINE

## 2021-04-22 PROCEDURE — 36415 COLL VENOUS BLD VENIPUNCTURE: CPT | Performed by: FAMILY MEDICINE

## 2021-04-22 ASSESSMENT — MIFFLIN-ST. JEOR: SCORE: 1542.95

## 2021-04-22 NOTE — NURSING NOTE
"Chief Complaint   Patient presents with     Gyn Exam     pap due--had mammo 3/19/21       Initial /70   Ht 1.702 m (5' 7\")   Wt 93.5 kg (206 lb 3.2 oz)   LMP 2013 (Approximate)   BMI 32.30 kg/m   Estimated body mass index is 32.3 kg/m  as calculated from the following:    Height as of this encounter: 1.702 m (5' 7\").    Weight as of this encounter: 93.5 kg (206 lb 3.2 oz).  BP completed using cuff size: regular    Questioned patient about current smoking habits.  Pt. quit smoking some time ago.          The following HM Due: pap smear             "

## 2021-04-22 NOTE — PATIENT INSTRUCTIONS
Return yearly   Labs today       Dr. Sophia Jiménez, DO    Obstetrics and Gynecology  [facility] - [place] and [place]

## 2021-04-22 NOTE — PROGRESS NOTES
SUBJECTIVE:  Ade Crowley is an 60 year old  postmenopausal woman   who presents for annual gyn exam. Menopause at age 54 with hysterectomy supracervical due to severe bladder scarring. No   bleeding, spotting, or discharge noted. Concerns:  none    Estrogen replacement therapy: never  NILA exposure: no  History of abnormal Pap smear: No  Family history of uterine or ovarian cancer: No  Regular self breast exam: Yes  History of abnormal mammogram: No  Family history of breast cancer: Yes: Mother age  age 77, had cancer since age 60  History of abnormal lipids: No  MGM:  Kidney or bone cancer     Past Medical History:   Diagnosis Date     Anesthesia complication     pt states with one of her C-sections the epidural didn't work and she needed general anesthesia     Depression      Infrequent menses     & heavy menses     SVT (supraventricular tachycardia) (H)     S/p ablation 12/10/15     WPW (Jaret-Parkinson-White syndrome) 1997          Family History   Problem Relation Age of Onset     Arthritis Mother      Cancer Mother         breast cancer stage 4 original [id number] recurred 2 years ago/skin cancer bcc     Gynecology Mother         uterine tumor with hysterectomy     Thyroid Disease Mother         hypothyroid     Breast Cancer Mother      Cardiovascular Father         chf  of heart attack age 68     Diabetes Daughter         type one diabetes   diagnosed age 9     Cancer Maternal Grandmother         bone cancer       Past Surgical History:   Procedure Laterality Date     APPENDECTOMY        SECTION  x3     COLONOSCOPY N/A 2014    Procedure: COMBINED COLONOSCOPY, SINGLE OR MULTIPLE BIOPSY/POLYPECTOMY BY BIOPSY;  Surgeon: Jesus Adler MD;  Location:  GI     DAVINCI HYSTERECTOMY SUPRACERVICAL  2013    Procedure: DAVINCI HYSTERECTOMY SUPRACERVICAL;  Frozen section dilation & curretage. Robotic Assisted  Laparoscopic Supracervical Hysterectomy, Bilateral  "Salpingectomy, Cystoscopy ;  Surgeon: Sophia Jiménez DO;  Location: [facility] OR     DILATION AND CURETTAGE  2013    Procedure: DILATION AND CURETTAGE;;  Surgeon: Sophia Jiménez DO;  Location: [facility] OR     H ABLATION SVT  12/10/2015    WPW     HC REMOVAL OF TONSILS,12+ Y/O       [facility] NONSPECIFIC PROCEDURE      Tubal ligation     [facility] NONSPECIFIC PROCEDURE      C-sections x 3     [facility] NONSPECIFIC PROCEDURE      left breast biopsy--benign       Current Outpatient Medications   Medication     buPROPion (WELLBUTRIN XL) 300 MG 24 hr tablet     citalopram (CELEXA) 10 MG tablet     No current facility-administered medications for this visit.      Allergies   Allergen Reactions     No Known Drug Allergies      No Clinical Screening - See Comments Rash     Unable to find: Secret Clear deodorant - rash        Social History     Tobacco Use     Smoking status: Former Smoker     Years: 20.00     Types: Cigarettes     Quit date: 2007     Years since quittin.6     Smokeless tobacco: Never Used     Tobacco comment: 10/3/2015   Substance Use Topics     Alcohol use: Yes     Alcohol/week: 0.0 standard drinks     Comment: occasional beer/wine, few drinks every other weekend       Review Of Systems  Ears/Nose/Throat: negative  Respiratory: No shortness of breath, dyspnea on exertion, cough, or hemoptysis  Cardiovascular: negative  Gastrointestinal: negative  Genitourinary: negative  Constitutional, HEENT, cardiovascular, pulmonary, GI, , musculoskeletal, neuro, skin, endocrine and psych systems are negative, except as otherwise noted.    OBJECTIVE:  /70   Ht 1.702 m (5' 7\")   Wt 93.5 kg (206 lb 3.2 oz)   LMP 2013 (Approximate)   BMI 32.30 kg/m    General appearance: healthy, alert and no distress  Skin: Skin color, texture, turgor normal. No rashes or lesions.  Ears: negative  Nose/Sinuses: Nares normal. Septum midline. Mucosa normal. No drainage or sinus tenderness.  Oropharynx: Lips, mucosa, " and tongue normal. Teeth and gums normal.  Neck: Neck supple. No adenopathy. Thyroid symmetric, normal size,, Carotids without bruits.  Lungs: negative, Percussion normal. Good diaphragmatic excursion. Lungs clear  Heart: negative, PMI normal. No lifts, heaves, or thrills. RRR. No murmurs, clicks gallops or rub  Breasts: Inspection negative. No nipple discharge or bleeding. No masses.  Abdomen: Abdomen soft, non-tender. BS normal. No masses, organomegaly  Pelvic: Pelvic:  Pelvic examination with  pap/no Gonorrhea and Chlamydia   including  External genitalia normal   and vagina normal rugatted  atrophic  Examination of urethra  normal no masses, tenderness, scarring  bladder, no masses or tenderness  Cervix no lesions or discharge  Bimanual exam with   Uterus surgically absent   Normal no masses     ASSESSMENT:  Ade Crowley is an 60 year old  postmenopausal woman   who presents for annual gyn exam.  Concerns:  None   PLAN:  Dx:  1)  Pap smear, mammogram  2)  Lipids at appropriate intervals    PE:  Reviewed health maintenance including diet, regular exercise,   estrogen replacement and periodic exams.    Dr. Sophia Jiménez, DO    Obstetrics and Gynecology  [facility] - [place] and [place]

## 2021-04-27 LAB
COPATH REPORT: NORMAL
PAP: NORMAL

## 2021-04-28 LAB
FINAL DIAGNOSIS: NORMAL
HPV HR 12 DNA CVX QL NAA+PROBE: NEGATIVE
HPV16 DNA SPEC QL NAA+PROBE: NEGATIVE
HPV18 DNA SPEC QL NAA+PROBE: NEGATIVE
SPECIMEN DESCRIPTION: NORMAL
SPECIMEN SOURCE CVX/VAG CYTO: NORMAL

## 2021-08-21 DIAGNOSIS — F32.0 MAJOR DEPRESSIVE DISORDER, SINGLE EPISODE, MILD (H): ICD-10-CM

## 2021-08-21 DIAGNOSIS — F43.0 ACUTE REACTION TO STRESS: ICD-10-CM

## 2021-08-23 DIAGNOSIS — F32.0 MAJOR DEPRESSIVE DISORDER, SINGLE EPISODE, MILD (H): ICD-10-CM

## 2021-08-23 RX ORDER — CITALOPRAM HYDROBROMIDE 10 MG/1
TABLET ORAL
Qty: 90 TABLET | Refills: 0 | Status: SHIPPED | OUTPATIENT
Start: 2021-08-23 | End: 2021-10-21

## 2021-08-23 NOTE — TELEPHONE ENCOUNTER
Call to pt to schedule PHYS.     Next 5 appointments (look out 90 days)    Oct 21, 2021  4:15 PM  PHYSICAL with Verona Johnson MD  [facility] ([facility] ) [id number] [address] [phone]        Medication is being filled for 1 time refill only due to:  Patient needs to be seen because it has been more than one year since last visit. to get to scheduled appt. \  Citalopram     Tyra SILVERMAN RN

## 2021-08-25 RX ORDER — BUPROPION HYDROCHLORIDE 300 MG/1
300 TABLET ORAL EVERY MORNING
Qty: 90 TABLET | Refills: 0 | Status: SHIPPED | OUTPATIENT
Start: 2021-08-25 | End: 2021-10-21

## 2021-08-25 NOTE — TELEPHONE ENCOUNTER
buPROPion (WELLBUTRIN XL) 300 MG 24 hr tablet    Last Written Prescription Date:  7/28/2020  Last Fill Quantity: 90,  # refills: 3   Last office visit:7/28/2020  (VIRTUAL VISIT)  Future Office Visit:   Next 5 appointments (look out 90 days)    Oct 21, 2021  4:15 PM  PHYSICAL with Verona Johnson MD  [facility] ([facility] [place] ) [address] [address] [phone]         Patient has an upcoming appointment.  Will give refill to get her through.    PHQ 6/25/2018 6/25/2019 7/15/2020   PHQ-9 Total Score 1 2 1   Q9: Thoughts of better off dead/self-harm past 2 weeks Not at all Not at all Not at all     PAMELA-7 SCORE 6/25/2018 6/25/2019 7/15/2020   Total Score - - -   Total Score - - 6 (mild anxiety)   Total Score 1 0 6     Gertrude Hernandez RN

## 2021-10-21 ENCOUNTER — OFFICE VISIT (OUTPATIENT)
Dept: FAMILY MEDICINE | Facility: CLINIC | Age: 61
End: 2021-10-21
Payer: COMMERCIAL

## 2021-10-21 VITALS
SYSTOLIC BLOOD PRESSURE: 110 MMHG | OXYGEN SATURATION: 96 % | HEIGHT: 66 IN | TEMPERATURE: 97.5 F | WEIGHT: 211.7 LBS | RESPIRATION RATE: 16 BRPM | BODY MASS INDEX: 34.02 KG/M2 | HEART RATE: 70 BPM | DIASTOLIC BLOOD PRESSURE: 80 MMHG

## 2021-10-21 DIAGNOSIS — Z00.00 ROUTINE GENERAL MEDICAL EXAMINATION AT A HEALTH CARE FACILITY: Primary | ICD-10-CM

## 2021-10-21 DIAGNOSIS — F32.0 MAJOR DEPRESSIVE DISORDER, SINGLE EPISODE, MILD (H): ICD-10-CM

## 2021-10-21 DIAGNOSIS — Z11.4 SCREENING FOR HIV (HUMAN IMMUNODEFICIENCY VIRUS): ICD-10-CM

## 2021-10-21 DIAGNOSIS — F43.0 ACUTE REACTION TO STRESS: ICD-10-CM

## 2021-10-21 DIAGNOSIS — Z12.11 SPECIAL SCREENING FOR MALIGNANT NEOPLASMS, COLON: ICD-10-CM

## 2021-10-21 DIAGNOSIS — D12.6 TUBULAR ADENOMA OF COLON: ICD-10-CM

## 2021-10-21 DIAGNOSIS — Z11.59 NEED FOR HEPATITIS C SCREENING TEST: ICD-10-CM

## 2021-10-21 DIAGNOSIS — R73.09 ELEVATED GLUCOSE: ICD-10-CM

## 2021-10-21 LAB — HBA1C MFR BLD: 5.2 % (ref 0–5.6)

## 2021-10-21 PROCEDURE — 87389 HIV-1 AG W/HIV-1&-2 AB AG IA: CPT | Performed by: INTERNAL MEDICINE

## 2021-10-21 PROCEDURE — 99213 OFFICE O/P EST LOW 20 MIN: CPT | Mod: 25 | Performed by: INTERNAL MEDICINE

## 2021-10-21 PROCEDURE — 90682 RIV4 VACC RECOMBINANT DNA IM: CPT | Performed by: INTERNAL MEDICINE

## 2021-10-21 PROCEDURE — 90471 IMMUNIZATION ADMIN: CPT | Performed by: INTERNAL MEDICINE

## 2021-10-21 PROCEDURE — 86803 HEPATITIS C AB TEST: CPT | Performed by: INTERNAL MEDICINE

## 2021-10-21 PROCEDURE — 82947 ASSAY GLUCOSE BLOOD QUANT: CPT | Performed by: INTERNAL MEDICINE

## 2021-10-21 PROCEDURE — 36415 COLL VENOUS BLD VENIPUNCTURE: CPT | Performed by: INTERNAL MEDICINE

## 2021-10-21 PROCEDURE — 99396 PREV VISIT EST AGE 40-64: CPT | Mod: 25 | Performed by: INTERNAL MEDICINE

## 2021-10-21 PROCEDURE — 83525 ASSAY OF INSULIN: CPT | Performed by: INTERNAL MEDICINE

## 2021-10-21 PROCEDURE — 96127 BRIEF EMOTIONAL/BEHAV ASSMT: CPT | Performed by: INTERNAL MEDICINE

## 2021-10-21 PROCEDURE — 83036 HEMOGLOBIN GLYCOSYLATED A1C: CPT | Performed by: INTERNAL MEDICINE

## 2021-10-21 RX ORDER — BUPROPION HYDROCHLORIDE 300 MG/1
300 TABLET ORAL EVERY MORNING
Qty: 90 TABLET | Refills: 3 | Status: SHIPPED | OUTPATIENT
Start: 2021-10-21 | End: 2023-02-10

## 2021-10-21 RX ORDER — CITALOPRAM HYDROBROMIDE 10 MG/1
10 TABLET ORAL DAILY
Qty: 90 TABLET | Refills: 3 | Status: SHIPPED | OUTPATIENT
Start: 2021-10-21 | End: 2023-02-10

## 2021-10-21 ASSESSMENT — ANXIETY QUESTIONNAIRES
1. FEELING NERVOUS, ANXIOUS, OR ON EDGE: NOT AT ALL
6. BECOMING EASILY ANNOYED OR IRRITABLE: NOT AT ALL
2. NOT BEING ABLE TO STOP OR CONTROL WORRYING: NOT AT ALL
IF YOU CHECKED OFF ANY PROBLEMS ON THIS QUESTIONNAIRE, HOW DIFFICULT HAVE THESE PROBLEMS MADE IT FOR YOU TO DO YOUR WORK, TAKE CARE OF THINGS AT HOME, OR GET ALONG WITH OTHER PEOPLE: NOT DIFFICULT AT ALL
3. WORRYING TOO MUCH ABOUT DIFFERENT THINGS: NOT AT ALL
7. FEELING AFRAID AS IF SOMETHING AWFUL MIGHT HAPPEN: NOT AT ALL
GAD7 TOTAL SCORE: 0
5. BEING SO RESTLESS THAT IT IS HARD TO SIT STILL: NOT AT ALL

## 2021-10-21 ASSESSMENT — ENCOUNTER SYMPTOMS
DIARRHEA: 0
DYSURIA: 0
HEADACHES: 0
JOINT SWELLING: 0
PALPITATIONS: 0
CHILLS: 0
EYE PAIN: 0
ABDOMINAL PAIN: 0
SORE THROAT: 0
COUGH: 0
NERVOUS/ANXIOUS: 0
FEVER: 0
HEMATURIA: 0
NAUSEA: 0
HEARTBURN: 0
PARESTHESIAS: 0
FREQUENCY: 0
SHORTNESS OF BREATH: 0
DIZZINESS: 0
MYALGIAS: 0
WEAKNESS: 0
ARTHRALGIAS: 0
CONSTIPATION: 0
HEMATOCHEZIA: 0

## 2021-10-21 ASSESSMENT — MIFFLIN-ST. JEOR: SCORE: 1539.07

## 2021-10-21 ASSESSMENT — PAIN SCALES - GENERAL: PAINLEVEL: NO PAIN (0)

## 2021-10-21 ASSESSMENT — PATIENT HEALTH QUESTIONNAIRE - PHQ9
5. POOR APPETITE OR OVEREATING: NOT AT ALL
SUM OF ALL RESPONSES TO PHQ QUESTIONS 1-9: 3

## 2021-10-21 NOTE — PATIENT INSTRUCTIONS
The 10-year ASCVD risk score (Abiola GARCIA Jr., et al., 2013) is: 2.9%    Values used to calculate the score:      Age: 61 years      Sex: Female      Is Non- : No      Diabetic: No      Tobacco smoker: No      Systolic Blood Pressure: 110 mmHg      Is BP treated: No      HDL Cholesterol: 47 mg/dL      Total Cholesterol: 214 mg/dL     If risk greater than 7.5%, then statin therapy should be considered.    Lab Results   Component Value Date    GLC 92 04/22/2021     09/04/2018

## 2021-10-21 NOTE — PROGRESS NOTES
Chief Complaint   Patient presents with     Physical     Not fasting        SUBJECTIVE:   CC: Ade Crowley is an 61 year old woman who presents for preventive health visit.     {  Patient has been advised of split billing requirements and indicates understanding: Yes  Healthy Habits:     Getting at least 3 servings of Calcium per day:  NO    Bi-annual eye exam:  Yes    Dental care twice a year:  Yes    Sleep apnea or symptoms of sleep apnea:  None    Diet:  Regular (no restrictions)    Frequency of exercise:  2-3 days/week    Duration of exercise:  15-30 minutes    Taking medications regularly:  Yes    Medication side effects:  None    PHQ-2 Total Score: 0    Additional concerns today:  No      Today's PHQ-2 Score:   PHQ-2 (  [employer]) 10/21/2021   Q1: Little interest or pleasure in doing things 0   Q2: Feeling down, depressed or hopeless 0   PHQ-2 Score 0   Q1: Little interest or pleasure in doing things Not at all   Q2: Feeling down, depressed or hopeless Not at all   PHQ-2 Score 0       Abuse: Current or Past (Physical, Sexual or Emotional) - No  Do you feel safe in your environment? Yes        Social History     Tobacco Use     Smoking status: Former Smoker     Years: 20.00     Types: Cigarettes     Quit date: 2007     Years since quittin.1     Smokeless tobacco: Never Used     Tobacco comment: 10/3/2015   Substance Use Topics     Alcohol use: Yes     Alcohol/week: 0.0 standard drinks     Comment: occasional beer/wine, few drinks every other weekend     If you drink alcohol do you typically have >3 drinks per day or >7 drinks per week? No    Alcohol Use 10/21/2021   Prescreen: >3 drinks/day or >7 drinks/week? No   Prescreen: >3 drinks/day or >7 drinks/week? -     Reviewed orders with patient.  Reviewed health maintenance and updated orders accordingly - Yes  Labs reviewed in EPIC  BP Readings from Last 3 Encounters:   10/21/21 110/80   21 116/70   19 112/80    Wt Readings from Last 3  Encounters:   10/21/21 96 kg (211 lb 11.2 oz)   21 93.5 kg (206 lb 3.2 oz)   19 92.5 kg (204 lb)                  Patient Active Problem List   Diagnosis     Pt reports Del Cid Parkinson White     Family history of malignant neoplasm of breast     Menorrhagia     CARDIOVASCULAR SCREENING; LDL GOAL LESS THAN 160     Lumbago     Nonallopathic lesion of sacral region     Pain in joint, pelvic region and thigh     Nonallopathic lesion of lower extremities     Mild major depression (H)     S/P hysterectomy     Special screening for malignant neoplasms, colon     Syncope     Jaret-Parkinson-White (WPW) syndrome     Vasovagal syncope     SVT (supraventricular tachycardia) (H)     Major depressive disorder, single episode, mild (H)     Past Surgical History:   Procedure Laterality Date     APPENDECTOMY        SECTION  x3     COLONOSCOPY N/A 2014    Procedure: COMBINED COLONOSCOPY, SINGLE OR MULTIPLE BIOPSY/POLYPECTOMY BY BIOPSY;  Surgeon: Jesus Adler MD;  Location: [facility] GI     DAVINCI HYSTERECTOMY SUPRACERVICAL  2013    Procedure: DAVINCI HYSTERECTOMY SUPRACERVICAL;  Frozen section dilation & curretage. Robotic Assisted  Laparoscopic Supracervical Hysterectomy, Bilateral Salpingectomy, Cystoscopy ;  Surgeon: Sophia Jiménez DO;  Location: [facility] OR     DILATION AND CURETTAGE  2013    Procedure: DILATION AND CURETTAGE;;  Surgeon: Sophia Jiménez DO;  Location: [facility] OR     H ABLATION SVT  12/10/2015    WPW     HC REMOVAL OF TONSILS,12+ Y/O       [facility] NONSPECIFIC PROCEDURE      Tubal ligation     [facility] NONSPECIFIC PROCEDURE      C-sections x 3     [facility] NONSPECIFIC PROCEDURE      left breast biopsy--benign       Social History     Tobacco Use     Smoking status: Former Smoker     Years: 20.00     Types: Cigarettes     Quit date: 2007     Years since quittin.1     Smokeless tobacco: Never Used     Tobacco comment: 10/3/2015   Substance Use Topics     Alcohol use: Yes      Alcohol/week: 0.0 standard drinks     Comment: occasional beer/wine, few drinks every other weekend     Family History   Problem Relation Age of Onset     Arthritis Mother      Cancer Mother         breast cancer stage 4 original [id number] recurred 2 years ago/skin cancer bcc     Gynecology Mother         uterine tumor with hysterectomy     Thyroid Disease Mother         hypothyroid     Breast Cancer Mother      Cardiovascular Father         chf  of heart attack age 68     Diabetes Daughter         type one diabetes   diagnosed age 9     Cancer Maternal Grandmother         bone cancer         Current Outpatient Medications   Medication Sig Dispense Refill     buPROPion (WELLBUTRIN XL) 300 MG 24 hr tablet Take 1 tablet (300 mg) by mouth every morning 90 tablet 3     citalopram (CELEXA) 10 MG tablet Take 1 tablet (10 mg) by mouth daily 90 tablet 3     Allergies   Allergen Reactions     No Known Drug Allergies      Unknown [No Clinical Screening - See Comments] Rash     Unable to find: Secret Clear deodorant - rash      Recent Labs   Lab Test 21  1457 18  0832 16  0812 16  0812   * 96  --  107*   HDL 47* 37*  --  42*   TRIG 192* 239*  --  130   ALT 20 29  --  19   CR 0.86 0.79   < > 0.78   GFRESTIMATED 73 75   < > 77   GFRESTBLACK 85 >90   < > >90  African American GFR Calc     POTASSIUM 4.0 4.7   < > 4.0   TSH 2.98 2.95   < > 2.29    < > = values in this interval not displayed.        Breast Cancer Screening:  Any new diagnosis of family breast, ovarian, or bowel cancer? No    FHS-7:   Breast CA Risk Assessment (FHS-7) 10/21/2021   Did any of your first-degree relatives have breast or ovarian cancer? Yes, mother had breast cancer   Did any of your relatives have bilateral breast cancer? Unknown   Did any man in your family have breast cancer? No   Did any woman in your family have breast and ovarian cancer? No   Did any woman in your family have breast cancer before age 50 y? No    Do you have 2 or more relatives with breast and/or ovarian cancer? No   Do you have 2 or more relatives with breast and/or bowel cancer? No     click delete button to remove this line now  Mammogram Screening: Recommended mammography every 1-2 years with patient discussion and risk factor consideration  Pertinent mammograms are reviewed under the imaging tab.    PAP / HPV Latest Ref Rng & Units 2021 2016 4/15/2009   PAP (Historical) - NIL NIL NIL   HPV16 NEG:Negative Negative Negative -   HPV18 NEG:Negative Negative Negative -   HRHPV NEG:Negative Negative Negative -     Reviewed and updated as needed this visit by clinical staff  Tobacco  Allergies  Meds   Med Hx  Surg Hx  Fam Hx  Soc Hx        Reviewed and updated as needed this visit by Provider                Past Medical History:   Diagnosis Date     Anesthesia complication     pt states with one of her C-sections the epidural didn't work and she needed general anesthesia     Depression      Infrequent menses     & heavy menses     SVT (supraventricular tachycardia) (H)     S/p ablation 12/10/15     WPW (Jaret-Parkinson-White syndrome) 1997      Past Surgical History:   Procedure Laterality Date     APPENDECTOMY        SECTION  x3     COLONOSCOPY N/A 2014    Procedure: COMBINED COLONOSCOPY, SINGLE OR MULTIPLE BIOPSY/POLYPECTOMY BY BIOPSY;  Surgeon: Jesus Adler MD;  Location: [facility]     DAVINCI HYSTERECTOMY SUPRACERVICAL  2013    Procedure: DAVINCI HYSTERECTOMY SUPRACERVICAL;  Frozen section dilation & curretage. Robotic Assisted  Laparoscopic Supracervical Hysterectomy, Bilateral Salpingectomy, Cystoscopy ;  Surgeon: Sophia Jiménez DO;  Location: [facility] OR     DILATION AND CURETTAGE  2013    Procedure: DILATION AND CURETTAGE;;  Surgeon: Sophia Jiménez DO;  Location: [facility] OR     H ABLATION SVT  12/10/2015    WPW     HC REMOVAL OF TONSILS,12+ Y/O       [facility] NONSPECIFIC PROCEDURE      Tubal ligation      "[facility] NONSPECIFIC PROCEDURE      C-sections x 3     [facility] NONSPECIFIC PROCEDURE  5/09    left breast biopsy--benign       Review of Systems   Constitutional: Negative for chills and fever.   HENT: Negative for congestion, ear pain, hearing loss and sore throat.    Eyes: Negative for pain and visual disturbance.   Respiratory: Negative for cough and shortness of breath.    Cardiovascular: Negative for chest pain, palpitations and peripheral edema.   Gastrointestinal: Negative for abdominal pain, constipation, diarrhea, heartburn, hematochezia and nausea.   Genitourinary: Negative for dysuria, frequency, genital sores, hematuria, pelvic pain, urgency and vaginal bleeding.   Musculoskeletal: Negative for arthralgias, joint swelling and myalgias.   Skin: Negative for rash.   Neurological: Negative for dizziness, weakness, headaches and paresthesias.   Psychiatric/Behavioral: Negative for mood changes. The patient is not nervous/anxious.           OBJECTIVE:   /80 (BP Location: Right arm, Patient Position: Sitting, Cuff Size: Adult Regular)   Pulse 70   Temp 97.5  F (36.4  C) (Oral)   Resp 16   Ht 1.664 m (5' 5.5\")   Wt 96 kg (211 lb 11.2 oz)   LMP 03/23/2013 (Approximate)   SpO2 96%   BMI 34.69 kg/m    Physical Exam  GENERAL: healthy, alert and no distress  EYES: Eyes grossly normal to inspection  HENT: ear canals and TM's normal, nose and mouth without ulcers or lesions  NECK: no adenopathy, no asymmetry, masses, or scars and thyroid normal to palpation  RESP: lungs clear to auscultation - no rales, rhonchi or wheezes  CV: regular rate and rhythm, normal S1 S2, no S3 or S4, no murmur, click or rub, no peripheral edema and peripheral pulses strong  ABDOMEN: soft, nontender, no hepatosplenomegaly, no masses and bowel sounds normal  MS: no gross musculoskeletal defects noted, no edema  NEURO: Normal strength and tone, mentation intact and speech normal  PSYCH: mentation appears normal, affect " normal/bright    Diagnostic Test Results:  Labs reviewed in Epic            ASSESSMENT/PLAN:   (Z00.00) Routine general medical examination at a health care facility  (primary encounter diagnosis)  Comment: HEALTH CARE MAINTENANCE reviewed;  Plan: reviewed colonoscopy, immunization and other HM    (D12.6) Tubular adenoma of colon  Comment: reviewed colonoscopy from 2016; 5 year follow up reviewd.  Plan: Adult Gastro Ref - Procedure Only          (F32.0) Major depressive disorder, single episode, mild (H)  Comment:   PHQ 6/25/2019 7/15/2020 10/21/2021   PHQ-9 Total Score 2 1 3   Q9: Thoughts of better off dead/self-harm past 2 weeks Not at all Not at all Not at all     PAMELA-7 SCORE 6/25/2019 7/15/2020 10/21/2021   Total Score - - -   Total Score - 6 (mild anxiety) -   Total Score 0 6 0       Reviewed medications; well tolerated  Plan: buPROPion (WELLBUTRIN XL) 300 MG 24 hr tablet,         citalopram (CELEXA) 10 MG tablet          (F43.0) Acute reaction to stress  Comment: continue Wellbutrin, Citalopram   Plan: citalopram (CELEXA) 10 MG tablet          (R73.09) Elevated glucose  Comment: elevated glucose; reviewed insulin resistance and options to assess monitor and treat if it is present; increased risk for diabetes. .  Plan: Hemoglobin A1c, Glucose, Insulin level        If note insulin resistance, consider treating with Metformin.    (Z12.11) Special screening for malignant neoplasms, colon  Comment: colon screening due  Plan: Adult Gastro Ref - Procedure Only         (Z11.59) Need for hepatitis C screening test  Comment: screening  Plan: Hepatitis C Screen Reflex to HCV RNA Quant and         Genotype          (Z11.4) Screening for HIV (human immunodeficiency virus)  Comment: screening  Plan: HIV Antigen Antibody Combo            Patient has been advised of split billing requirements and indicates understanding: Yes  COUNSELING:  Reviewed preventive health counseling, as reflected in patient instructions        "Regular exercise       Healthy diet/nutrition       Colon cancer screening    Estimated body mass index is 34.69 kg/m  as calculated from the following:    Height as of this encounter: 1.664 m (5' 5.5\").    Weight as of this encounter: 96 kg (211 lb 11.2 oz).    Weight management plan: Discussed healthy diet and exercise guidelines    She reports that she quit smoking about 14 years ago. Her smoking use included cigarettes. She quit after 20.00 years of use. She has never used smokeless tobacco.      Counseling Resources:  ATP IV Guidelines  Pooled Cohorts Equation Calculator  Breast Cancer Risk Calculator  BRCA-Related Cancer Risk Assessment: FHS-7 Tool  FRAX Risk Assessment  ICSI Preventive Guidelines  Dietary Guidelines for Americans, 2010  USDA's MyPlate  ASA Prophylaxis  Lung CA Screening    Verona Johnson MD  Internal Medicine   [facility]    20 minutes in addition to HEALTH CARE MAINTENANCE are spent with patient, over 50% of that time spent providing counselling, discussing and reviewing medical conditions/concerns, meds and potential side effects.    "

## 2021-10-22 LAB
HCV AB SERPL QL IA: NONREACTIVE
HIV 1+2 AB+HIV1 P24 AG SERPL QL IA: NONREACTIVE
INSULIN SERPL-ACNC: 10 MU/L (ref 3–25)

## 2021-10-22 ASSESSMENT — ANXIETY QUESTIONNAIRES: GAD7 TOTAL SCORE: 0

## 2021-10-23 LAB
FASTING STATUS PATIENT QL REPORTED: NO
GLUCOSE BLD-MCNC: 78 MG/DL (ref 70–99)

## 2022-03-18 ENCOUNTER — HOSPITAL ENCOUNTER (OUTPATIENT)
Dept: MAMMOGRAPHY | Facility: CLINIC | Age: 62
Discharge: HOME | End: 2022-03-18
Attending: INTERNAL MEDICINE | Admitting: INTERNAL MEDICINE
Payer: COMMERCIAL

## 2022-03-18 DIAGNOSIS — Z12.31 VISIT FOR SCREENING MAMMOGRAM: ICD-10-CM

## 2022-03-18 PROCEDURE — 77067 SCR MAMMO BI INCL CAD: CPT

## 2022-12-03 ENCOUNTER — HEALTH MAINTENANCE LETTER (OUTPATIENT)
Age: 62
End: 2022-12-03

## 2023-02-10 ENCOUNTER — OFFICE VISIT (OUTPATIENT)
Dept: INTERNAL MEDICINE | Facility: CLINIC | Age: 63
End: 2023-02-10
Payer: COMMERCIAL

## 2023-02-10 VITALS
WEIGHT: 207 LBS | DIASTOLIC BLOOD PRESSURE: 89 MMHG | HEART RATE: 87 BPM | RESPIRATION RATE: 18 BRPM | BODY MASS INDEX: 33.27 KG/M2 | TEMPERATURE: 96.6 F | SYSTOLIC BLOOD PRESSURE: 137 MMHG | HEIGHT: 66 IN | OXYGEN SATURATION: 98 %

## 2023-02-10 DIAGNOSIS — Z78.0 ENCOUNTER FOR OSTEOPOROSIS SCREENING IN ASYMPTOMATIC POSTMENOPAUSAL PATIENT: ICD-10-CM

## 2023-02-10 DIAGNOSIS — R73.09 ELEVATED GLUCOSE: ICD-10-CM

## 2023-02-10 DIAGNOSIS — Z13.820 ENCOUNTER FOR OSTEOPOROSIS SCREENING IN ASYMPTOMATIC POSTMENOPAUSAL PATIENT: ICD-10-CM

## 2023-02-10 DIAGNOSIS — Z87.891 PERSONAL HISTORY OF TOBACCO USE, PRESENTING HAZARDS TO HEALTH: ICD-10-CM

## 2023-02-10 DIAGNOSIS — E87.5 SERUM POTASSIUM ELEVATED: ICD-10-CM

## 2023-02-10 DIAGNOSIS — Z00.00 ENCOUNTER FOR PREVENTIVE HEALTH EXAMINATION: Primary | ICD-10-CM

## 2023-02-10 DIAGNOSIS — Z13.1 SCREENING FOR DIABETES MELLITUS: ICD-10-CM

## 2023-02-10 DIAGNOSIS — Z12.11 SPECIAL SCREENING FOR MALIGNANT NEOPLASMS, COLON: ICD-10-CM

## 2023-02-10 DIAGNOSIS — E78.5 HYPERLIPIDEMIA LDL GOAL <130: ICD-10-CM

## 2023-02-10 LAB
ANION GAP SERPL CALCULATED.3IONS-SCNC: 12 MMOL/L (ref 7–15)
BUN SERPL-MCNC: 19.3 MG/DL (ref 8–23)
CALCIUM SERPL-MCNC: 10.1 MG/DL (ref 8.8–10.2)
CHLORIDE SERPL-SCNC: 105 MMOL/L (ref 98–107)
CHOLEST SERPL-MCNC: 231 MG/DL
CREAT SERPL-MCNC: 0.79 MG/DL (ref 0.51–0.95)
DEPRECATED HCO3 PLAS-SCNC: 24 MMOL/L (ref 22–29)
GFR SERPL CREATININE-BSD FRML MDRD: 85 ML/MIN/1.73M2
GLUCOSE SERPL-MCNC: 108 MG/DL (ref 70–99)
HBA1C MFR BLD: 5.2 % (ref 0–5.6)
HDLC SERPL-MCNC: 50 MG/DL
LDLC SERPL CALC-MCNC: 149 MG/DL
NONHDLC SERPL-MCNC: 181 MG/DL
POTASSIUM SERPL-SCNC: 5.5 MMOL/L (ref 3.4–5.3)
SODIUM SERPL-SCNC: 141 MMOL/L (ref 136–145)
TRIGL SERPL-MCNC: 160 MG/DL

## 2023-02-10 PROCEDURE — 83036 HEMOGLOBIN GLYCOSYLATED A1C: CPT

## 2023-02-10 PROCEDURE — 99396 PREV VISIT EST AGE 40-64: CPT

## 2023-02-10 PROCEDURE — 80061 LIPID PANEL: CPT

## 2023-02-10 PROCEDURE — 80048 BASIC METABOLIC PNL TOTAL CA: CPT

## 2023-02-10 PROCEDURE — 36415 COLL VENOUS BLD VENIPUNCTURE: CPT

## 2023-02-10 ASSESSMENT — ENCOUNTER SYMPTOMS
ABDOMINAL PAIN: 0
FREQUENCY: 0
HEADACHES: 0
DIARRHEA: 0
HEARTBURN: 0
HEMATOCHEZIA: 0
FEVER: 0
SORE THROAT: 0
PARESTHESIAS: 0
BREAST MASS: 0
WEAKNESS: 0
NERVOUS/ANXIOUS: 0
SHORTNESS OF BREATH: 0
ARTHRALGIAS: 0
CHILLS: 0
CONSTIPATION: 0
MYALGIAS: 0
NAUSEA: 0
DIZZINESS: 0
COUGH: 0
DYSURIA: 0
JOINT SWELLING: 0
PALPITATIONS: 0
EYE PAIN: 0
HEMATURIA: 0

## 2023-02-10 ASSESSMENT — PATIENT HEALTH QUESTIONNAIRE - PHQ9
SUM OF ALL RESPONSES TO PHQ QUESTIONS 1-9: 3
10. IF YOU CHECKED OFF ANY PROBLEMS, HOW DIFFICULT HAVE THESE PROBLEMS MADE IT FOR YOU TO DO YOUR WORK, TAKE CARE OF THINGS AT HOME, OR GET ALONG WITH OTHER PEOPLE: NOT DIFFICULT AT ALL
SUM OF ALL RESPONSES TO PHQ QUESTIONS 1-9: 3

## 2023-02-10 ASSESSMENT — PAIN SCALES - GENERAL: PAINLEVEL: NO PAIN (0)

## 2023-02-10 NOTE — PROGRESS NOTES
SUBJECTIVE:   CC: Ade is an 62 year old who presents for preventive health visit.     Patient has been advised of split billing requirements and indicates understanding: Yes  Healthy Habits:     Getting at least 3 servings of Calcium per day:  NO    Bi-annual eye exam:  Yes    Dental care twice a year:  Yes    Sleep apnea or symptoms of sleep apnea:  None    Diet:  Regular (no restrictions)    Frequency of exercise:  1 day/week    Duration of exercise:  Less than 15 minutes    Taking medications regularly:  Yes    Medication side effects:  None    PHQ-2 Total Score: 0    Additional concerns today:  Yes      Today's PHQ-2 Score:   PHQ-2 ( 1999 [employer]) 2/10/2023   Q1: Little interest or pleasure in doing things 0   Q2: Feeling down, depressed or hopeless 0   PHQ-2 Score 0   PHQ-2 Total Score (12-17 Years)- Positive if 3 or more points; Administer PHQ-A if positive -   Q1: Little interest or pleasure in doing things Not at all   Q2: Feeling down, depressed or hopeless Not at all   PHQ-2 Score 0       Social History     Tobacco Use     Smoking status: Former     Years: 20.00     Types: Cigarettes     Quit date: 9/1/2007     Years since quitting: 15.4     Passive exposure: Past     Smokeless tobacco: Never     Tobacco comments:     10/3/2015   Substance Use Topics     Alcohol use: Yes     Alcohol/week: 0.0 standard drinks     Comment: occasional beer/wine, few drinks every other weekend         Alcohol Use 2/10/2023   Prescreen: >3 drinks/day or >7 drinks/week? Yes   Prescreen: >3 drinks/day or >7 drinks/week? -       Reviewed orders with patient.  Reviewed health maintenance and updated orders accordingly - Yes  Lab work is in process    Breast Cancer Screening:    FHS-7:   Breast CA Risk Assessment (FHS-7) 10/21/2021 1/11/2023 2/10/2023   Did any of your first-degree relatives have breast or ovarian cancer? Yes Yes No   Did any of your relatives have bilateral breast cancer? Unknown Unknown Yes   Did any man in  your family have breast cancer? No No No   Did any woman in your family have breast and ovarian cancer? Yes No No   Did any woman in your family have breast cancer before age 50 y? No No Unknown   Do you have 2 or more relatives with breast and/or ovarian cancer? No Unknown No   Do you have 2 or more relatives with breast and/or bowel cancer? No Unknown No       Mammogram Screening: Recommended mammography every 1-2 years with patient discussion and risk factor consideration and   Pertinent mammograms are reviewed under the imaging tab.    History of abnormal Pap smear: NO - age 30-65 PAP every 5 years with negative HPV co-testing recommended  PAP / HPV Latest Ref Rng & Units 4/22/2021 7/27/2016 4/15/2009   PAP (Historical) - NIL NIL NIL   HPV16 NEG:Negative Negative Negative -   HPV18 NEG:Negative Negative Negative -   HRHPV NEG:Negative Negative Negative -     Reviewed and updated as needed this visit by clinical staff   Tobacco  Allergies  Meds   Med Hx  Surg Hx  Fam Hx          Reviewed and updated as needed this visit by Provider                   Review of Systems   Constitutional: Negative for chills and fever.   HENT: Negative for congestion, ear pain, hearing loss and sore throat.    Eyes: Negative for pain and visual disturbance.   Respiratory: Negative for cough and shortness of breath.    Cardiovascular: Negative for chest pain, palpitations and peripheral edema.   Gastrointestinal: Negative for abdominal pain, constipation, diarrhea, heartburn, hematochezia and nausea.   Breasts:  Negative for tenderness, breast mass and discharge.   Genitourinary: Negative for dysuria, frequency, genital sores, hematuria, pelvic pain, urgency, vaginal bleeding and vaginal discharge.   Musculoskeletal: Negative for arthralgias, joint swelling and myalgias.   Skin: Negative for rash.   Neurological: Negative for dizziness, weakness, headaches and paresthesias.   Psychiatric/Behavioral: Negative for mood changes.  "The patient is not nervous/anxious.      OBJECTIVE:   /89   Pulse 87   Temp (!) 96.6  F (35.9  C) (Tympanic)   Resp 18   Ht 1.664 m (5' 5.5\")   Wt 93.9 kg (207 lb)   LMP 03/23/2013 (Approximate)   SpO2 98%   Breastfeeding No   BMI 33.92 kg/m      Physical Exam  Constitutional:       General: She is not in acute distress.     Appearance: Normal appearance. She is not ill-appearing, toxic-appearing or diaphoretic.   HENT:      Head: Normocephalic and atraumatic.   Eyes:      Conjunctiva/sclera: Conjunctivae normal.   Cardiovascular:      Rate and Rhythm: Normal rate and regular rhythm.      Heart sounds: Normal heart sounds.   Pulmonary:      Effort: Pulmonary effort is normal.      Breath sounds: Normal breath sounds.   Skin:     General: Skin is warm and dry.   Neurological:      Mental Status: She is alert and oriented to person, place, and time.   Psychiatric:         Mood and Affect: Mood normal.         Behavior: Behavior normal.         Thought Content: Thought content normal.         Judgment: Judgment normal.     Diagnostic Test Results:  Labs reviewed in Epic    ASSESSMENT/PLAN:   (Z00.00) Encounter for preventive health examination  (primary encounter diagnosis)  Comment: Pt presents for annual physical.     (Z87.891) Personal history of tobacco use, presenting hazards to health  Plan: CT Chest Lung Cancer Scrn Low Dose wo            (Z12.11) Special screening for malignant neoplasms, colon  Plan: Colonoscopy Screening  Referral           (Z13.1) Screening for diabetes mellitus  Plan: Basic metabolic panel  (Ca, Cl, CO2, Creat,         Gluc, K, Na, BUN)            (E78.5) Hyperlipidemia LDL goal <130  Plan: Lipid panel reflex to direct LDL Fasting           (R73.09) Elevated glucose  Plan: Hemoglobin A1c            (Z13.820,  Z78.0) Encounter for osteoporosis screening in asymptomatic postmenopausal patient  Plan: DX Hip/Pelvis/Spine            Patient has been advised of split " "billing requirements and indicates understanding: Yes      COUNSELING:  Reviewed preventive health counseling, as reflected in patient instructions       Regular exercise       Healthy diet/nutrition       Osteoporosis prevention/bone health       Colorectal Cancer Screening      BMI:   Estimated body mass index is 33.92 kg/m  as calculated from the following:    Height as of this encounter: 1.664 m (5' 5.5\").    Weight as of this encounter: 93.9 kg (207 lb).   Weight management plan: Discussed healthy diet and exercise guidelines      She reports that she quit smoking about 15 years ago. Her smoking use included cigarettes. She has been exposed to tobacco smoke. She has never used smokeless tobacco.          Caroline Zarate, HAROLDO CNP  [facility]  Answers for HPI/ROS submitted by the patient on 2/10/2023  If you checked off any problems, how difficult have these problems made it for you to do your work, take care of things at home, or get along with other people?: Not difficult at all  PHQ9 TOTAL SCORE: 3      "

## 2023-03-01 ENCOUNTER — LAB (OUTPATIENT)
Dept: LAB | Facility: CLINIC | Age: 63
End: 2023-03-01
Payer: COMMERCIAL

## 2023-03-01 DIAGNOSIS — E87.5 SERUM POTASSIUM ELEVATED: ICD-10-CM

## 2023-03-01 LAB
ANION GAP SERPL CALCULATED.3IONS-SCNC: 11 MMOL/L (ref 7–15)
BUN SERPL-MCNC: 14.2 MG/DL (ref 8–23)
CALCIUM SERPL-MCNC: 9.3 MG/DL (ref 8.8–10.2)
CHLORIDE SERPL-SCNC: 104 MMOL/L (ref 98–107)
CREAT SERPL-MCNC: 0.83 MG/DL (ref 0.51–0.95)
DEPRECATED HCO3 PLAS-SCNC: 25 MMOL/L (ref 22–29)
GFR SERPL CREATININE-BSD FRML MDRD: 80 ML/MIN/1.73M2
GLUCOSE SERPL-MCNC: 112 MG/DL (ref 70–99)
POTASSIUM SERPL-SCNC: 4.5 MMOL/L (ref 3.4–5.3)
SODIUM SERPL-SCNC: 140 MMOL/L (ref 136–145)

## 2023-03-01 PROCEDURE — 80048 BASIC METABOLIC PNL TOTAL CA: CPT

## 2023-03-01 PROCEDURE — 36415 COLL VENOUS BLD VENIPUNCTURE: CPT

## 2023-03-24 ENCOUNTER — TELEPHONE (OUTPATIENT)
Dept: GASTROENTEROLOGY | Facility: CLINIC | Age: 63
End: 2023-03-24
Payer: COMMERCIAL

## 2023-03-24 NOTE — TELEPHONE ENCOUNTER
Screening Questions  BLUE  KIND OF PREP RED  LOCATION [review exclusion criteria] GREEN  SEDATION TYPE        Y Are you active on mychart?       Caroline Zarate, APRN CNP    Ordering/Referring Provider?        BCBS What type of coverage do you have?      N Have you had a positive covid test in the last 14 days?     32.3 1. BMI  [BMI 40+ - review exclusion criteria]    Y  2. Are you able to give consent for your medical care? [IF NO,RN REVIEW]          N  3. Are you taking any prescription pain medications on a routine schedule   (ex narcotics: oxycodone, roxicodone, oxycontin,  and percocet)? [RN Review]          3a. EXTENDED PREP What kind of prescription?     N 4. Do you have any chemical dependencies such as alcohol, street drugs, or methadone?        **If yes 3- 5 , please schedule with MAC sedation.**          IF YES TO ANY 6 - 10 - HOSPITAL SETTING ONLY.     N 6.   Do you need assistance transferring?     N 7.   Have you had a heart or lung transplant?    N 8.   Are you currently on dialysis?   N 9.   Do you use daily home oxygen?   N 10. Do you take nitroglycerin?   10a.  If yes, how often?     11. [FEMALES]  N Are you currently pregnant?    11a.  If yes, how many weeks? [ Greater than 12 weeks, OR NEEDED]    N 12. Do you have Pulmonary Hypertension? *NEED PAC APPT AT [facility] w/ MAC*     N 13. [review exclusion criteria]  Do you have any implantable devices in your body (pacemaker, defib, LVAD)?    N 14. In the past 6 months, have you had any heart related issues including cardiomyopathy or heart attack?     14a.  If yes, did it require cardiac stenting if so when?     N 15. Have you had a stroke or Transient ischemic attack (TIA - aka  mini stroke ) within 6 months?      N 16. Do you have mod to severe Obstructive Sleep Apnea?  [Hospital only]    N 17. Do you have SEVERE AND UNCONTROLLED asthma? *NEED PAC APPT AT [facility] w/MAC*     18. Are you currently taking any blood thinners?     18a. No. Continue to  "19.   18b. Yes/no Blood Thinner: No [CONTINUE TO #19]    N 19. Do you take the medication Phentermine?    19a. If yes, \"Hold for 7 days before procedure.  Please consult your prescribing provider if you have questions about holding this medication.\"     N  20. Do you have chronic kidney disease?      N  21. Do you have a diagnosis of diabetes?     N  22. On a regular basis do you go 3-5 days between bowel movements?      23. Preferred LOCAL Pharmacy for Pre Prescription    [ LIST ONLY ONE PHARMACY]       [facility] [address] [place] - [address]      - CLOSING REMINDERS -    Informed patient they will need an adult    Cannot take any type of public or medical transportation alone    Conscious Sedation- Needs  for 6 hours after the procedure       MAC/General-Needs  for 24 hours after procedure    Pre-Procedure Covid test to be completed [[facility] PCR Testing Required]    Confirmed Nurse will call to complete assessment       - SCHEDULING DETAILS -  NO Hospital Setting Required? If yes, what is the exclusion?:    ANDRES  Surgeon    6/16  Date of Procedure  Lower Endoscopy [Colonoscopy]  Type of Procedure Scheduled  [facility] Location   [facility]-If you answer yes to questions #8, #20, #21Which Colonoscopy Prep was Sent?     MODERATE Sedation Type     N PAC / Pre-op Required                 "

## 2023-03-28 ENCOUNTER — HOSPITAL ENCOUNTER (OUTPATIENT)
Dept: MAMMOGRAPHY | Facility: CLINIC | Age: 63
Discharge: HOME | End: 2023-03-28
Attending: INTERNAL MEDICINE | Admitting: INTERNAL MEDICINE
Payer: COMMERCIAL

## 2023-03-28 DIAGNOSIS — Z12.31 VISIT FOR SCREENING MAMMOGRAM: ICD-10-CM

## 2023-03-28 PROCEDURE — 77067 SCR MAMMO BI INCL CAD: CPT

## 2023-03-31 ENCOUNTER — TELEPHONE (OUTPATIENT)
Dept: FAMILY MEDICINE | Facility: CLINIC | Age: 63
End: 2023-03-31
Payer: COMMERCIAL

## 2023-03-31 NOTE — TELEPHONE ENCOUNTER
Received call from pt with c/o dull constant ache low in her abdomen- started a week ago  Comes and goes   Pt wants to see her PCP, no appts available for several weeks  Offered an appt with another provider sooner- pt refused and said she would go to the  if it got really bad    Prabhu Rodriguez, RN on 3/31/2023 at 11:26 AM

## 2023-04-05 ENCOUNTER — OFFICE VISIT (OUTPATIENT)
Dept: INTERNAL MEDICINE | Facility: CLINIC | Age: 63
End: 2023-04-05
Payer: COMMERCIAL

## 2023-04-05 VITALS
OXYGEN SATURATION: 99 % | HEART RATE: 72 BPM | BODY MASS INDEX: 33.28 KG/M2 | SYSTOLIC BLOOD PRESSURE: 128 MMHG | WEIGHT: 207.1 LBS | HEIGHT: 66 IN | TEMPERATURE: 96.2 F | DIASTOLIC BLOOD PRESSURE: 84 MMHG | RESPIRATION RATE: 13 BRPM

## 2023-04-05 DIAGNOSIS — S76.212A STRAIN OF LEFT GROIN: Primary | ICD-10-CM

## 2023-04-05 PROCEDURE — 99213 OFFICE O/P EST LOW 20 MIN: CPT | Performed by: INTERNAL MEDICINE

## 2023-04-05 NOTE — NURSING NOTE
"/84   Pulse 72   Temp (!) 96.2  F (35.7  C) (Tympanic)   Resp 13   Ht 1.664 m (5' 5.5\")   Wt 93.9 kg (207 lb 1.6 oz)   LMP 03/23/2013 (Approximate)   SpO2 99%   BMI 33.94 kg/m      "

## 2023-04-05 NOTE — PROGRESS NOTES
Assessment & Plan     (S76.212A) Strain of left groin  (primary encounter diagnosis)  Plan: explained about the condition, advised to avoid activities that flares up the symptoms,  recommended heat/ice as needed, over-the-counter ibuprofen as directed patient was also advised to use over-the-counter diclofenac gel as directed.      24 minutes spent by me on the date of the encounter doing chart review, history and exam, documentation and further activities per the note       David Fry MD  [facility] Hospital[facility]IC[facility] Booker is a 62 year old, presenting for the following health issues:  Consult (Abdominal/groin pain)         View : No data to display.              History of Present Illness       Reason for visit:  Going on 2 weeks with lower abdomen/joint pain.  Symptom onset:  1-2 weeks ago  Symptoms include:  The pain has settled to a dull ache and heavy feeling in my lower left abdominal and groin joint area.  Symptom intensity:  Moderate  Symptom progression:  Improving  Had these symptoms before:  No  What makes it worse:  Exertion  What makes it better:  Ibuprofen, rest    She eats 2-3 servings of fruits and vegetables daily.She consumes 0 sweetened beverage(s) daily.She exercises with enough effort to increase her heart rate 10 to 19 minutes per day.  She exercises with enough effort to increase her heart rate 5 days per week.   She is taking medications regularly.       Pt is a 62 year old female who is seen here to day with c/o lt groin pain since 2 wks, sometimes radiates to left inner thigh, also had some lt lower abdominal discomfort but that has resolved, patient also states that the left groin pain is getting better.  No tingling/numbness/weakness of bilateral lower extremities, no bladder or bowel incontinence.  Not taking any pain medications, no history of injury but  patient has been doing more work than usual like lifting  as her  had recent  back  "issues .       Past Medical History:   Diagnosis Date     Anesthesia complication     pt states with one of her C-sections the epidural didn't work and she needed general anesthesia     Depression      Infrequent menses     & heavy menses     SVT (supraventricular tachycardia) (H)     S/p ablation 12/10/15     WPW (Jaret-Parkinson-White syndrome) 2/18/1997       No current outpatient medications on file.           Review of Systems   CONSTITUTIONAL: NEGATIVE for fever, chills, change in weight  RESP: NEGATIVE for significant cough or SOB  CV: NEGATIVE for chest pain, palpitations or peripheral edema  MUSCULOSKELETAL: lt groin pain   NEURO: NEGATIVE for weakness, or paresthesias      Objective    /84   Pulse 72   Temp (!) 96.2  F (35.7  C) (Tympanic)   Resp 13   Ht 1.664 m (5' 5.5\")   Wt 93.9 kg (207 lb 1.6 oz)   LMP 03/23/2013 (Approximate)   SpO2 99%   BMI 33.94 kg/m    Body mass index is 33.94 kg/m .  Physical Exam   GENERAL: healthy, alert and no distress  RESP: lungs clear to auscultation - no rales, rhonchi or wheezes  CV: regular rate and rhythm, normal S1 S2,    ABD: soft, BS normal , no abdominal tenderness  MS: Has tenderness in the left groin area and also on the left inner upper thigh  NEURO: Normal strength and tone, mentation intact and speech normal           "

## 2023-05-29 RX ORDER — BISACODYL 5 MG/1
TABLET, DELAYED RELEASE ORAL
Qty: 4 TABLET | Refills: 0 | Status: SHIPPED | OUTPATIENT
Start: 2023-05-29 | End: 2024-01-04

## 2023-06-16 ENCOUNTER — HOSPITAL ENCOUNTER (OUTPATIENT)
Facility: CLINIC | Age: 63
Discharge: HOME | End: 2023-06-16
Attending: INTERNAL MEDICINE | Admitting: INTERNAL MEDICINE
Payer: COMMERCIAL

## 2023-06-16 VITALS
WEIGHT: 207 LBS | SYSTOLIC BLOOD PRESSURE: 112 MMHG | HEIGHT: 66 IN | HEART RATE: 54 BPM | RESPIRATION RATE: 22 BRPM | BODY MASS INDEX: 33.27 KG/M2 | OXYGEN SATURATION: 94 % | TEMPERATURE: 97 F | DIASTOLIC BLOOD PRESSURE: 77 MMHG

## 2023-06-16 DIAGNOSIS — Z12.11 COLON CANCER SCREENING: Primary | ICD-10-CM

## 2023-06-16 LAB — COLONOSCOPY: NORMAL

## 2023-06-16 PROCEDURE — 45385 COLONOSCOPY W/LESION REMOVAL: CPT | Mod: PT | Performed by: INTERNAL MEDICINE

## 2023-06-16 PROCEDURE — 45380 COLONOSCOPY AND BIOPSY: CPT | Performed by: INTERNAL MEDICINE

## 2023-06-16 PROCEDURE — 88305 TISSUE EXAM BY PATHOLOGIST: CPT | Mod: TC | Performed by: INTERNAL MEDICINE

## 2023-06-16 PROCEDURE — 99153 MOD SED SAME PHYS/QHP EA: CPT | Mod: PT | Performed by: INTERNAL MEDICINE

## 2023-06-16 PROCEDURE — 88305 TISSUE EXAM BY PATHOLOGIST: CPT | Mod: 26 | Performed by: PATHOLOGY

## 2023-06-16 PROCEDURE — G0500 MOD SEDAT ENDO SERVICE >5YRS: HCPCS | Performed by: INTERNAL MEDICINE

## 2023-06-16 PROCEDURE — 250N000011 HC RX IP 250 OP 636: Performed by: INTERNAL MEDICINE

## 2023-06-16 RX ORDER — FLUMAZENIL 0.1 MG/ML
0.2 INJECTION, SOLUTION INTRAVENOUS
Status: DISCONTINUED | OUTPATIENT
Start: 2023-06-16 | End: 2023-06-16 | Stop reason: HOSPADM

## 2023-06-16 RX ORDER — FENTANYL CITRATE 50 UG/ML
50-100 INJECTION, SOLUTION INTRAMUSCULAR; INTRAVENOUS EVERY 5 MIN PRN
Status: DISCONTINUED | OUTPATIENT
Start: 2023-06-16 | End: 2023-06-16 | Stop reason: HOSPADM

## 2023-06-16 RX ORDER — SIMETHICONE 40MG/0.6ML
133 SUSPENSION, DROPS(FINAL DOSAGE FORM)(ML) ORAL
Status: DISCONTINUED | OUTPATIENT
Start: 2023-06-16 | End: 2023-06-16 | Stop reason: HOSPADM

## 2023-06-16 RX ORDER — NALOXONE HYDROCHLORIDE 0.4 MG/ML
0.2 INJECTION, SOLUTION INTRAMUSCULAR; INTRAVENOUS; SUBCUTANEOUS
Status: DISCONTINUED | OUTPATIENT
Start: 2023-06-16 | End: 2023-06-16 | Stop reason: HOSPADM

## 2023-06-16 RX ORDER — NALOXONE HYDROCHLORIDE 0.4 MG/ML
0.4 INJECTION, SOLUTION INTRAMUSCULAR; INTRAVENOUS; SUBCUTANEOUS
Status: DISCONTINUED | OUTPATIENT
Start: 2023-06-16 | End: 2023-06-16 | Stop reason: HOSPADM

## 2023-06-16 RX ORDER — ONDANSETRON 2 MG/ML
4 INJECTION INTRAMUSCULAR; INTRAVENOUS EVERY 6 HOURS PRN
Status: DISCONTINUED | OUTPATIENT
Start: 2023-06-16 | End: 2023-06-16 | Stop reason: HOSPADM

## 2023-06-16 RX ORDER — EPINEPHRINE 1 MG/ML
0.1 INJECTION, SOLUTION INTRAMUSCULAR; SUBCUTANEOUS
Status: DISCONTINUED | OUTPATIENT
Start: 2023-06-16 | End: 2023-06-16 | Stop reason: HOSPADM

## 2023-06-16 RX ORDER — LIDOCAINE 40 MG/G
CREAM TOPICAL
Status: DISCONTINUED | OUTPATIENT
Start: 2023-06-16 | End: 2023-06-16 | Stop reason: HOSPADM

## 2023-06-16 RX ORDER — ATROPINE SULFATE 0.1 MG/ML
1 INJECTION INTRAVENOUS
Status: DISCONTINUED | OUTPATIENT
Start: 2023-06-16 | End: 2023-06-16 | Stop reason: HOSPADM

## 2023-06-16 RX ORDER — ONDANSETRON 4 MG/1
4 TABLET, ORALLY DISINTEGRATING ORAL EVERY 6 HOURS PRN
Status: DISCONTINUED | OUTPATIENT
Start: 2023-06-16 | End: 2023-06-16 | Stop reason: HOSPADM

## 2023-06-16 RX ORDER — ONDANSETRON 2 MG/ML
4 INJECTION INTRAMUSCULAR; INTRAVENOUS
Status: DISCONTINUED | OUTPATIENT
Start: 2023-06-16 | End: 2023-06-16 | Stop reason: HOSPADM

## 2023-06-16 RX ORDER — DIPHENHYDRAMINE HYDROCHLORIDE 50 MG/ML
25-50 INJECTION INTRAMUSCULAR; INTRAVENOUS
Status: DISCONTINUED | OUTPATIENT
Start: 2023-06-16 | End: 2023-06-16 | Stop reason: HOSPADM

## 2023-06-16 RX ORDER — PROCHLORPERAZINE MALEATE 10 MG
10 TABLET ORAL EVERY 6 HOURS PRN
Status: DISCONTINUED | OUTPATIENT
Start: 2023-06-16 | End: 2023-06-16 | Stop reason: HOSPADM

## 2023-06-16 RX ADMIN — FENTANYL CITRATE 50 MCG: 50 INJECTION, SOLUTION INTRAMUSCULAR; INTRAVENOUS at 10:10

## 2023-06-16 RX ADMIN — FENTANYL CITRATE 100 MCG: 50 INJECTION, SOLUTION INTRAMUSCULAR; INTRAVENOUS at 09:49

## 2023-06-16 RX ADMIN — FENTANYL CITRATE 50 MCG: 50 INJECTION, SOLUTION INTRAMUSCULAR; INTRAVENOUS at 09:55

## 2023-06-16 RX ADMIN — MIDAZOLAM 2 MG: 1 INJECTION INTRAMUSCULAR; INTRAVENOUS at 09:49

## 2023-06-16 RX ADMIN — MIDAZOLAM 1 MG: 1 INJECTION INTRAMUSCULAR; INTRAVENOUS at 09:58

## 2023-06-16 RX ADMIN — MIDAZOLAM 1 MG: 1 INJECTION INTRAMUSCULAR; INTRAVENOUS at 10:15

## 2023-06-16 ASSESSMENT — ACTIVITIES OF DAILY LIVING (ADL): ADLS_ACUITY_SCORE: 35

## 2023-06-16 NOTE — PRE-PROCEDURE
Pre-Endoscopy History and Physical     Ade Crowley MRN# [id number]   YOB: 1960 Age: 62 year old     Date of Procedure: 6/16/2023  Primary care provider: Verona Johnson  Type of Endoscopy: colonoscopy  Reason for Procedure: hx polyp  Type of Anesthesia Anticipated: Moderate (conscious) sedation    HPI:    Ade is a 62 year old female who will be undergoing the above procedure.      A history and physical has been performed. The patient's medications and allergies have been reviewed. The risks and benefits of the procedure and the sedation options and risks were discussed with the patient.  All questions were answered and informed consent was obtained.      Allergies   Allergen Reactions     No Known Drug Allergy      Unknown [No Clinical Screening - See Comments] Rash     Unable to find: Secret Clear deodorant - rash         Current Facility-Administered Medications   Medication     0.9% sodium chloride BOLUS     atropine injection 1 mg     benzocaine 20% (HURRICAINE/TOPEX) 20 % spray 0.5 mL     diphenhydrAMINE (BENADRYL) injection 25-50 mg     EPINEPHrine (Anaphylaxis) (ADRENALIN) injection (vial) 0.1 mg     fentaNYL (PF) (SUBLIMAZE) injection  mcg     flumazenil (ROMAZICON) injection 0.2 mg     glucagon injection 0.5 mg     lidocaine (LMX4) cream     lidocaine 1 % 0.1-1 mL     midazolam (VERSED) injection 0.5-2 mg     naloxone (NARCAN) injection 0.2 mg    Or     naloxone (NARCAN) injection 0.4 mg    Or     naloxone (NARCAN) injection 0.2 mg    Or     naloxone (NARCAN) injection 0.4 mg     ondansetron (ZOFRAN) injection 4 mg     simethicone (MYLICON) suspension 133 mg     sodium chloride (PF) 0.9% PF flush 3 mL     sodium chloride (PF) 0.9% PF flush 3 mL     sodium chloride (PF) 0.9% PF flush 3 mL       Patient Active Problem List   Diagnosis     Pt reports Del Cid Parkinson White     Family history of malignant neoplasm of breast     Menorrhagia     CARDIOVASCULAR SCREENING; LDL GOAL  LESS THAN 160     Lumbago     Nonallopathic lesion of sacral region     Pain in joint, pelvic region and thigh     Nonallopathic lesion of lower extremities     Mild major depression (H)     S/P hysterectomy     Special screening for malignant neoplasms, colon     Syncope     Jaret-Parkinson-White (WPW) syndrome     Vasovagal syncope     SVT (supraventricular tachycardia) (H)     Major depressive disorder, single episode, mild (H)        Past Medical History:   Diagnosis Date     Anesthesia complication     pt states with one of her C-sections the epidural didn't work and she needed general anesthesia     Depression      Infrequent menses     & heavy menses     SVT (supraventricular tachycardia) (H)     S/p ablation 12/10/15     WPW (Jaret-Parkinson-White syndrome) 1997        Past Surgical History:   Procedure Laterality Date     APPENDECTOMY        SECTION  x3     COLONOSCOPY N/A 2014    Procedure: COMBINED COLONOSCOPY, SINGLE OR MULTIPLE BIOPSY/POLYPECTOMY BY BIOPSY;  Surgeon: Jesus Adler MD;  Location: [facility]     DAVINCI HYSTERECTOMY SUPRACERVICAL  2013    Procedure: DAVINCI HYSTERECTOMY SUPRACERVICAL;  Frozen section dilation & curretage. Robotic Assisted  Laparoscopic Supracervical Hysterectomy, Bilateral Salpingectomy, Cystoscopy ;  Surgeon: Sophia Jiménez DO;  Location: [facility] OR     DILATION AND CURETTAGE  2013    Procedure: DILATION AND CURETTAGE;;  Surgeon: Sophia Jiménez DO;  Location: [facility] OR     H ABLATION SVT  12/10/2015    WPW     HC REMOVAL OF TONSILS,12+ Y/O       [facility] NONSPECIFIC PROCEDURE      Tubal ligation     [facility] NONSPECIFIC PROCEDURE      C-sections x 3     [facility] NONSPECIFIC PROCEDURE      left breast biopsy--benign       Social History     Tobacco Use     Smoking status: Former     Packs/day: 0.50     Years: 37.00     Pack years: 18.50     Types: Cigarettes     Start date:      Quit date: 2015     Years since quittin.4     Passive  "exposure: Past     Smokeless tobacco: Never     Tobacco comments:     10/3/2015   Vaping Use     Vaping status: Never Used     Passive vaping exposure: Yes   Substance Use Topics     Alcohol use: Yes     Alcohol/week: 0.0 standard drinks of alcohol     Comment: occasional beer/wine, few drinks every other weekend       Family History   Problem Relation Age of Onset     Arthritis Mother      Cancer Mother         breast cancer stage 4 original [id number] recurred 2 years ago/skin cancer bcc     Gynecology Mother         uterine tumor with hysterectomy     Thyroid Disease Mother         hypothyroid     Breast Cancer Mother      Cardiovascular Father         chf  of heart attack age 68     Cancer Maternal Grandmother         bone cancer     Diabetes Daughter         type one diabetes   diagnosed age 9     Colon Cancer No family hx of             Medications:     No medications prior to admission.       Scheduled Medications:    sodium chloride (PF)  3 mL Intracatheter Q8H       PRN:  sodium chloride 0.9%, atropine, benzocaine 20%, diphenhydrAMINE, EPINEPHrine, fentaNYL, flumazenil, glucagon, lidocaine 4%, lidocaine (buffered or not buffered), midazolam, naloxone **OR** naloxone **OR** naloxone **OR** naloxone, ondansetron, simethicone, sodium chloride (PF), sodium chloride (PF)    PHYSICAL EXAM:   /85   Pulse 68   Temp 97  F (36.1  C) (Temporal)   Resp 18   Ht 1.664 m (5' 5.5\")   Wt 93.9 kg (207 lb)   LMP 2013 (Approximate)   SpO2 95%   BMI 33.92 kg/m   Estimated body mass index is 33.92 kg/m  as calculated from the following:    Height as of this encounter: 1.664 m (5' 5.5\").    Weight as of this encounter: 93.9 kg (207 lb).   RESP: lungs clear to auscultation - no rales, rhonchi or wheezes  CV: regular rates and rhythm    IMPRESSION   ASA Class 2 - Mild systemic disease      Signed Electronically by: Jesus Olmos MD  2023    .            "

## 2023-06-19 LAB
PATH REPORT.COMMENTS IMP SPEC: NORMAL
PATH REPORT.COMMENTS IMP SPEC: NORMAL
PATH REPORT.FINAL DX SPEC: NORMAL
PATH REPORT.GROSS SPEC: NORMAL
PATH REPORT.MICROSCOPIC SPEC OTHER STN: NORMAL
PATH REPORT.RELEVANT HX SPEC: NORMAL
PHOTO IMAGE: NORMAL

## 2023-06-29 ENCOUNTER — OFFICE VISIT (OUTPATIENT)
Dept: OBGYN | Facility: CLINIC | Age: 63
End: 2023-06-29
Payer: COMMERCIAL

## 2023-06-29 VITALS
WEIGHT: 205.6 LBS | SYSTOLIC BLOOD PRESSURE: 110 MMHG | DIASTOLIC BLOOD PRESSURE: 68 MMHG | BODY MASS INDEX: 33.04 KG/M2 | HEIGHT: 66 IN

## 2023-06-29 DIAGNOSIS — Z01.419 ENCOUNTER FOR GYNECOLOGICAL EXAMINATION (GENERAL) (ROUTINE) WITHOUT ABNORMAL FINDINGS: Primary | ICD-10-CM

## 2023-06-29 PROCEDURE — 99396 PREV VISIT EST AGE 40-64: CPT | Performed by: FAMILY MEDICINE

## 2023-06-29 NOTE — NURSING NOTE
"Chief Complaint   Patient presents with     Gyn Exam       Initial /68   Ht 1.664 m (5' 5.5\")   Wt 93.3 kg (205 lb 9.6 oz)   LMP 2013 (Approximate)   BMI 33.69 kg/m   Estimated body mass index is 33.69 kg/m  as calculated from the following:    Height as of this encounter: 1.664 m (5' 5.5\").    Weight as of this encounter: 93.3 kg (205 lb 9.6 oz).  BP completed using cuff size: regular    Questioned patient about current smoking habits.  Pt. quit smoking some time ago.          The following HM Due: NONE           "

## 2023-06-29 NOTE — PROGRESS NOTES
SUBJECTIVE:  Ade Crowley is an 62 year old  postmenopausal woman   who presents for annual gyn exam. Menopause at age 54 with hysterectomy   supracervical due to severe bladder scarring. No   bleeding, spotting, or discharge noted.     Estrogen replacement therapy: never  NILA exposure: no  History of abnormal Pap smear: No, due   Family history of uterine or ovarian cancer: No  Regular self breast exam: Yes  History of abnormal mammogram: No  Family history of breast cancer: Yes: Mother age  age 77, had cancer since age 60  History of abnormal lipids: No  MGM:  Kidney or bone cancer     Past Medical History:   Diagnosis Date     Anesthesia complication     pt states with one of her C-sections the epidural didn't work and she needed general anesthesia     Depression      Infrequent menses     & heavy menses     SVT (supraventricular tachycardia) (H)     S/p ablation 12/10/15     WPW (Jaret-Parkinson-White syndrome) 1997          Family History   Problem Relation Age of Onset     Arthritis Mother      Cancer Mother         breast cancer stage 4 original [id number] recurred 2 years ago/skin cancer bcc     Gynecology Mother         uterine tumor with hysterectomy     Thyroid Disease Mother         hypothyroid     Breast Cancer Mother      Cardiovascular Father         chf  of heart attack age 68     Cancer Maternal Grandmother         bone cancer     Diabetes Daughter         type one diabetes   diagnosed age 9     Colon Cancer No family hx of        Past Surgical History:   Procedure Laterality Date     APPENDECTOMY        SECTION  x3     COLONOSCOPY N/A 2014    Procedure: COMBINED COLONOSCOPY, SINGLE OR MULTIPLE BIOPSY/POLYPECTOMY BY BIOPSY;  Surgeon: Jesus Adler MD;  Location:  GI     COLONOSCOPY N/A 2023    Procedure: Colonoscopy polypectomies with snare and polypectomy with forceps;  Surgeon: Jesus Olmos MD;  Location:  GI     COLONOSCOPY N/A  2023    Procedure: COLONOSCOPY, WITH POLYPECTOMY AND BIOPSY;  Surgeon: Jesus Olmos MD;  Location: [facility]     DAVINCI HYSTERECTOMY SUPRACERVICAL  2013    Procedure: DAVINCI HYSTERECTOMY SUPRACERVICAL;  Frozen section dilation & curretage. Robotic Assisted  Laparoscopic Supracervical Hysterectomy, Bilateral Salpingectomy, Cystoscopy ;  Surgeon: Sophia Jiménez DO;  Location: [facility] OR     DILATION AND CURETTAGE  2013    Procedure: DILATION AND CURETTAGE;;  Surgeon: Sophia Jiménez DO;  Location: [facility] OR     H ABLATION SVT  12/10/2015    WPW     HC REMOVAL OF TONSILS,12+ Y/O       [facility] NONSPECIFIC PROCEDURE      Tubal ligation     [facility] NONSPECIFIC PROCEDURE      C-sections x 3     [facility] NONSPECIFIC PROCEDURE      left breast biopsy--benign       Current Outpatient Medications   Medication     bisacodyl (DULCOLAX) 5 MG EC tablet     polyethylene glycol (GOLYTELY) 236 g suspension     No current facility-administered medications for this visit.     Allergies   Allergen Reactions     No Known Drug Allergy      Unknown [No Clinical Screening - See Comments] Rash     Unable to find: Secret Clear deodorant - rash        Social History     Tobacco Use     Smoking status: Former     Packs/day: 0.50     Years: 37.00     Pack years: 18.50     Types: Cigarettes     Start date:      Quit date:      Years since quittin.4     Passive exposure: Past     Smokeless tobacco: Never     Tobacco comments:     10/3/2015   Substance Use Topics     Alcohol use: Yes     Alcohol/week: 0.0 standard drinks of alcohol     Comment: occasional beer/wine, few drinks every other weekend       Review Of Systems  Ears/Nose/Throat: negative  Respiratory: No shortness of breath, dyspnea on exertion, cough, or hemoptysis  Cardiovascular: negative  Gastrointestinal: negative  Genitourinary: See HPI   Constitutional, HEENT, cardiovascular, pulmonary, GI, , musculoskeletal, neuro, skin, endocrine and psych systems  "are negative, except as otherwise noted.    OBJECTIVE:  /68   Ht 1.664 m (5' 5.5\")   Wt 93.3 kg (205 lb 9.6 oz)   LMP 2013 (Approximate)   BMI 33.69 kg/m    General appearance: healthy, alert and no distress  Skin: Skin color, texture, turgor normal. No rashes or lesions.  Ears: negative  Nose/Sinuses: Nares normal. Septum midline. Mucosa normal. No drainage or sinus tenderness.  Oropharynx: Lips, mucosa, and tongue normal. Teeth and gums normal.  Neck: Neck supple. No adenopathy. Thyroid symmetric, normal size,, Carotids without bruits.  Lungs: negative, Percussion normal. Good diaphragmatic excursion. Lungs clear  Heart: negative, PMI normal. No lifts, heaves, or thrills. RRR. No murmurs, clicks gallops or rub  Breasts: Inspection negative. No nipple discharge or bleeding. No masses.  Abdomen: Abdomen soft, non-tender. BS normal. No masses, organomegaly  Pelvic: Pelvic:  Pelvic examination with no pap/no Gonorrhea and Chlamydia   including  External genitalia normal   and vagina normal rugatted not atrophic  Examination of urethra  normal no masses, tenderness, scarring  bladder, no masses or tenderness  Cervix no lesions or discharge  Bimanual exam with   uterus surgically absent   Normal adnexal     ASSESSMENT:  Ade Crowley is an 62 year old  postmenopausal woman   who presents for annual gyn exam.     PLAN:  Dx:  1)  Pap smear due  due to supracervical hysterectomy, mammogram ordered, Colonoscopy due   2)  Lipids at appropriate intervals  3)  Menopause:  Doing well, has some hot flushes now resolved     PE:  Reviewed health maintenance including diet, regular exercise,   estrogen replacement and periodic exams.    Dr. Sophia Jiménez, DO    Obstetrics and Gynecology  [facility] - [place] and [place]         "

## 2023-06-29 NOTE — PATIENT INSTRUCTIONS
Return yearly     Dr. Sophia Jiménez, DO    Obstetrics and Gynecology  [facility] - [place] and [place]

## 2023-11-10 ENCOUNTER — PATIENT OUTREACH (OUTPATIENT)
Dept: GASTROENTEROLOGY | Facility: CLINIC | Age: 63
End: 2023-11-10
Payer: COMMERCIAL

## 2024-01-02 ENCOUNTER — NURSE TRIAGE (OUTPATIENT)
Dept: FAMILY MEDICINE | Facility: CLINIC | Age: 64
End: 2024-01-02
Payer: COMMERCIAL

## 2024-01-02 NOTE — TELEPHONE ENCOUNTER
"Reason for Disposition   Symptoms interfere with work or school    Additional Information   Negative: Patient attempted suicide   Negative: Patient is threatening suicide now   Negative: Violent behavior, or threatening to physically hurt or kill someone   Negative: Patient is very confused (disoriented, slurred speech) and no other adult (e.g., friend or family member) available   Negative: Difficult to awaken or acting very confused (disoriented, slurred speech) and new-onset   Negative: Sounds like a life-threatening emergency to the triager   Negative: Suicide thoughts, threats, attempts, or questions   Negative: Questions or concerns about alcohol use, unhealthy alcohol use, binge drinking, intoxication, or withdrawal   Negative: Questions or concerns about substance use (drug use), unhealthy drug use, intoxication, or withdrawal   Negative: Anxiety is main problem or symptom   Negative: Depression and unable to do any of normal activities (e.g., self care, school, work; in comparison to baseline).   Negative: Very strange or confused behavior   Negative: Patient sounds very sick or weak to the triager   Negative: Fever > 101 F  (38.3 C)   Negative: Sometimes has thoughts of suicide    Answer Assessment - Initial Assessment Questions  1. CONCERN: \"What happened that made you call today?\"      Just feels out of control.  The last few months per family she is negative and difficult to be around.  Last few weeks crying, can hardly work, can't focus, can't sleep but all she wants to do is sleep  2. DEPRESSION SYMPTOM SCREENING: \"How are you feeling overall?\" (e.g., decreased energy, increased sleeping or difficulty sleeping, difficulty concentrating, feelings of sadness, guilt, hopelessness, or worthlessness)      Decreased energy, increased and difficulty sleeping, difficulty concentrating, feelings of sadness, guilt, hopelessness and worthlessness  3. RISK OF HARM - SUICIDAL IDEATION:  \"Do you ever have thoughts " "of hurting or killing yourself?\"  (e.g., yes, no, no but preoccupation with thoughts about death)    - INTENT:  \"Do you have thoughts of hurting or killing yourself right NOW?\" (e.g., yes, no, N/A)    - PLAN: \"Do you have a specific plan for how you would do this?\" (e.g., gun, knife, overdose, no plan, N/A)      Yes- feeling like would be better if she was not here.  No intent or plan  4. RISK OF HARM - HOMICIDAL IDEATION:  \"Do you ever have thoughts of hurting or killing someone else?\"  (e.g., yes, no, no but preoccupation with thoughts about death)    - INTENT:  \"Do you have thoughts of hurting or killing someone right NOW?\" (e.g., yes, no, N/A)    - PLAN: \"Do you have a specific plan for how you would do this?\" (e.g., gun, knife, no plan, N/A)       No  5. FUNCTIONAL IMPAIRMENT: \"How have things been going for you overall? Have you had more difficulty than usual doing your normal daily activities?\"  (e.g., better, same, worse; self-care, school, work, interactions)      Worse family and work  6. SUPPORT: \"Who is with you now?\" \"Who do you live with?\" \"Do you have family or friends who you can talk to?\"       Lives with , works, can not talk to  and does not talk to friends, just fights with  about it.  Does not talk to anyone about it.    7. THERAPIST: \"Do you have a counselor or therapist? Name?\"      No  8. STRESSORS: \"Has there been any new stress or recent changes in your life?\"      No  9. ALCOHOL USE OR SUBSTANCE USE (DRUG USE): \"Do you drink alcohol or use any illegal drugs?\"      No  10. OTHER: \"Do you have any other physical symptoms right now?\" (e.g., fever)        No  11. PREGNANCY: \"Is there any chance you are pregnant?\" \"When was your last menstrual period?\"        N/a    Protocols used: Depression-A-[place]    "

## 2024-01-03 ASSESSMENT — ANXIETY QUESTIONNAIRES
6. BECOMING EASILY ANNOYED OR IRRITABLE: NEARLY EVERY DAY
4. TROUBLE RELAXING: NEARLY EVERY DAY
GAD7 TOTAL SCORE: 16
GAD7 TOTAL SCORE: 16
2. NOT BEING ABLE TO STOP OR CONTROL WORRYING: MORE THAN HALF THE DAYS
3. WORRYING TOO MUCH ABOUT DIFFERENT THINGS: NEARLY EVERY DAY
7. FEELING AFRAID AS IF SOMETHING AWFUL MIGHT HAPPEN: MORE THAN HALF THE DAYS
1. FEELING NERVOUS, ANXIOUS, OR ON EDGE: MORE THAN HALF THE DAYS
GAD7 TOTAL SCORE: 16
8. IF YOU CHECKED OFF ANY PROBLEMS, HOW DIFFICULT HAVE THESE MADE IT FOR YOU TO DO YOUR WORK, TAKE CARE OF THINGS AT HOME, OR GET ALONG WITH OTHER PEOPLE?: EXTREMELY DIFFICULT
IF YOU CHECKED OFF ANY PROBLEMS ON THIS QUESTIONNAIRE, HOW DIFFICULT HAVE THESE PROBLEMS MADE IT FOR YOU TO DO YOUR WORK, TAKE CARE OF THINGS AT HOME, OR GET ALONG WITH OTHER PEOPLE: EXTREMELY DIFFICULT
7. FEELING AFRAID AS IF SOMETHING AWFUL MIGHT HAPPEN: MORE THAN HALF THE DAYS
5. BEING SO RESTLESS THAT IT IS HARD TO SIT STILL: SEVERAL DAYS

## 2024-01-03 ASSESSMENT — PATIENT HEALTH QUESTIONNAIRE - PHQ9
10. IF YOU CHECKED OFF ANY PROBLEMS, HOW DIFFICULT HAVE THESE PROBLEMS MADE IT FOR YOU TO DO YOUR WORK, TAKE CARE OF THINGS AT HOME, OR GET ALONG WITH OTHER PEOPLE: EXTREMELY DIFFICULT
SUM OF ALL RESPONSES TO PHQ QUESTIONS 1-9: 23
SUM OF ALL RESPONSES TO PHQ QUESTIONS 1-9: 23

## 2024-01-04 ENCOUNTER — VIRTUAL VISIT (OUTPATIENT)
Dept: FAMILY MEDICINE | Facility: CLINIC | Age: 64
End: 2024-01-04
Payer: COMMERCIAL

## 2024-01-04 DIAGNOSIS — F41.9 ANXIETY: ICD-10-CM

## 2024-01-04 DIAGNOSIS — F33.2 SEVERE EPISODE OF RECURRENT MAJOR DEPRESSIVE DISORDER, WITHOUT PSYCHOTIC FEATURES (H): Primary | ICD-10-CM

## 2024-01-04 PROBLEM — F32.0 MAJOR DEPRESSIVE DISORDER, SINGLE EPISODE, MILD (H): Status: RESOLVED | Noted: 2017-01-02 | Resolved: 2024-01-04

## 2024-01-04 PROCEDURE — 96127 BRIEF EMOTIONAL/BEHAV ASSMT: CPT | Mod: 95 | Performed by: NURSE PRACTITIONER

## 2024-01-04 PROCEDURE — 99214 OFFICE O/P EST MOD 30 MIN: CPT | Mod: 95 | Performed by: NURSE PRACTITIONER

## 2024-01-04 RX ORDER — ESCITALOPRAM OXALATE 10 MG/1
10 TABLET ORAL DAILY
Qty: 30 TABLET | Refills: 0 | Status: SHIPPED | OUTPATIENT
Start: 2024-01-04 | End: 2024-01-15

## 2024-01-04 ASSESSMENT — ENCOUNTER SYMPTOMS
GASTROINTESTINAL NEGATIVE: 1
ENDOCRINE NEGATIVE: 1
CARDIOVASCULAR NEGATIVE: 1
UNEXPECTED WEIGHT CHANGE: 0
CONSTITUTIONAL NEGATIVE: 1
NERVOUS/ANXIOUS: 1
RESPIRATORY NEGATIVE: 1
SHORTNESS OF BREATH: 0

## 2024-01-04 NOTE — PROGRESS NOTES
Assessment & Plan     ICD-10-CM    1. Severe episode of recurrent major depressive disorder, without psychotic features (H)  F33.2 escitalopram (LEXAPRO) 10 MG tablet     Hemoglobin     TSH with free T4 reflex      2. Anxiety  F41.9 Hemoglobin     TSH with free T4 reflex        Significant anxiety component to depressive disorder at this time.  Start escitalopram 10 mg daily and follow up for recheck to assure no side effects or worsening in 1-2 weeks.    Patient denies actual suicidal thoughts or plans.  Agrees to follow up if worsening.  Plan would be to stabilize on escitalpram and optimize with  wellbutrin if not controlled  with selective serotonin reuptake inhibitor given that she was well controlled for years on selective serotonin reuptake inhibitor + bupropion  Check hgb and TSH as well for fatigue symptoms.      HAROLDO Mckinnon CNP  [facility] [place]  ============================================  Subjective   Ade is a 63 year old, presenting for the following health issues:  Depression        1/4/2024     5:02 PM   Additional Questions   Roomed by Molly MTZ       History of Present Illness       Mental Health Follow-up:  Patient presents to follow-up on Depression.Patient's depression since last visit has been:  Bad  The patient is not having other symptoms associated with depression.      Any significant life events: No  Patient is not feeling anxious or having panic attacks.  Patient has no concerns about alcohol or drug use.    She eats 2-3 servings of fruits and vegetables daily.She consumes 0 sweetened beverage(s) daily.She exercises with enough effort to increase her heart rate 10 to 19 minutes per day.  She exercises with enough effort to increase her heart rate 5 days per week.   She is taking medications regularly.     On bupropion for smoking cessation and was on it for some time.  Chart review also shows she was on citalopram along with wellbutrin.  Stopped  "wellbutrin two years ago.  Is uncertain about when citalopram was stopped.     Worsening mood over the last 2 months.  Patient's family has noticed worsening mood over the last two months.  Has been sad, fatigued, anxious.  [place] is difficult for pt; she has worsened even more over the last two weeks. Denies suicidal ideation or plan.  Does feel in general it would be easier to \"not be here.\"  However has no intent to harm herself.  She feels it is time to restart medication.  Has support of family at this time.    Review of Systems   Constitutional: Negative.  Negative for unexpected weight change.   HENT: Negative.     Respiratory: Negative.  Negative for shortness of breath.    Cardiovascular: Negative.    Gastrointestinal: Negative.    Endocrine: Negative.    Psychiatric/Behavioral:  Positive for mood changes. Negative for suicidal ideas. The patient is nervous/anxious.             Objective    Vitals - Patient Reported  Weight (Patient Reported): 88 kg (194 lb)  Pain Score: No Pain (0)      Vitals:  No vitals were obtained today due to virtual visit.    Physical Exam   GENERAL: Healthy, alert and no distress  EYES: Eyes grossly normal to inspection.  No discharge or erythema, or obvious scleral/conjunctival abnormalities.  RESP: No audible wheeze, cough, or visible cyanosis.  No visible retractions or increased work of breathing.    SKIN: Visible skin clear. No significant rash, abnormal pigmentation or lesions.  NEURO: Cranial nerves grossly intact.  Mentation and speech appropriate for age.  PSYCH: Mentation appears normal, is fatigued appearing and mildly anxious appearing, judgement and insight intact, normal speech and appearance well-groomed.          7/15/2020    10:34 AM 10/21/2021     5:59 PM 1/3/2024     3:23 PM   PAMELA-7 SCORE   Total Score 6 (mild anxiety)  16 (severe anxiety)   Total Score 6 0 16         10/21/2021     5:59 PM 2/10/2023    10:08 AM 1/3/2024     3:22 PM   PHQ   PHQ-9 Total Score 3 " 3 23   Q9: Thoughts of better off dead/self-harm past 2 weeks Not at all Not at all More than half the days   F/U: Thoughts of suicide or self-harm   Yes   F/U: Self harm-plan   No   F/U: Self-harm action   No   F/U: Safety concerns   No           Video-Visit Details  Ade is a 63 year old  who is being evaluated via a billable video visit.    How would you like to obtain your AVS? MyChart  If the video visit is dropped, the invitation should be resent by: Text to cell phone: [phone]  Will anyone else be joining your video visit? no  If patient encounters technical issues they should call [phone] :  Originating Location (pt. Location): Home  Distant Location (provider location):  On-site  Platform used for Video Visit: Betsy    Type of service:  Video Visit     Originating Location (pt. Location): Home    Distant Location (provider location):  On-site  Platform used for Video Visit: Betsy

## 2024-01-08 ENCOUNTER — LAB (OUTPATIENT)
Dept: LAB | Facility: CLINIC | Age: 64
End: 2024-01-08
Payer: COMMERCIAL

## 2024-01-08 DIAGNOSIS — F41.9 ANXIETY: ICD-10-CM

## 2024-01-08 DIAGNOSIS — F33.2 SEVERE EPISODE OF RECURRENT MAJOR DEPRESSIVE DISORDER, WITHOUT PSYCHOTIC FEATURES (H): ICD-10-CM

## 2024-01-08 LAB
HGB BLD-MCNC: 13.8 G/DL (ref 11.7–15.7)
TSH SERPL DL<=0.005 MIU/L-ACNC: 2.37 UIU/ML (ref 0.3–4.2)

## 2024-01-08 PROCEDURE — 84443 ASSAY THYROID STIM HORMONE: CPT

## 2024-01-08 PROCEDURE — 36415 COLL VENOUS BLD VENIPUNCTURE: CPT

## 2024-01-08 PROCEDURE — 85018 HEMOGLOBIN: CPT

## 2024-01-15 ENCOUNTER — VIRTUAL VISIT (OUTPATIENT)
Dept: FAMILY MEDICINE | Facility: CLINIC | Age: 64
End: 2024-01-15
Attending: NURSE PRACTITIONER
Payer: COMMERCIAL

## 2024-01-15 DIAGNOSIS — F33.2 SEVERE EPISODE OF RECURRENT MAJOR DEPRESSIVE DISORDER, WITHOUT PSYCHOTIC FEATURES (H): ICD-10-CM

## 2024-01-15 PROCEDURE — 99213 OFFICE O/P EST LOW 20 MIN: CPT | Mod: 95 | Performed by: NURSE PRACTITIONER

## 2024-01-15 RX ORDER — ESCITALOPRAM OXALATE 10 MG/1
10 TABLET ORAL DAILY
Qty: 60 TABLET | Refills: 0 | Status: SHIPPED | OUTPATIENT
Start: 2024-01-15 | End: 2024-03-18

## 2024-01-15 ASSESSMENT — ENCOUNTER SYMPTOMS
AGITATION: 0
CONSTITUTIONAL NEGATIVE: 1
SLEEP DISTURBANCE: 0

## 2024-01-15 ASSESSMENT — PATIENT HEALTH QUESTIONNAIRE - PHQ9
10. IF YOU CHECKED OFF ANY PROBLEMS, HOW DIFFICULT HAVE THESE PROBLEMS MADE IT FOR YOU TO DO YOUR WORK, TAKE CARE OF THINGS AT HOME, OR GET ALONG WITH OTHER PEOPLE: SOMEWHAT DIFFICULT
SUM OF ALL RESPONSES TO PHQ QUESTIONS 1-9: 8
SUM OF ALL RESPONSES TO PHQ QUESTIONS 1-9: 8

## 2024-01-15 NOTE — PATIENT INSTRUCTIONS
Continue escitalopram.  I sent refill for 2 months to your pharmacy.  We should see each other for follow up in 2 months or sooner if you have concerns.

## 2024-01-15 NOTE — PROGRESS NOTES
"Assessment & Plan     ICD-10-CM    1. Severe episode of recurrent major depressive disorder, without psychotic features (H)  F33.2 escitalopram (LEXAPRO) 10 MG tablet   Improved significantly.  No side effects of medication  Continue escitalopram and follow up in 2 months.     Patient Instructions   Continue escitalopram.  I sent refill for 2 months to your pharmacy.  We should see each other for follow up in 2 months or sooner if you have concerns.        HAROLDO Mckinnon CNP  [facility] [place]  ============================================    Subjective   Ade is a 63 year old, presenting for the following health issues:  Depression and Recheck Medication        1/15/2024     4:52 PM   Additional Questions   Roomed by Lux ARCE   Accompanied by No one         1/15/2024     4:52 PM   Patient Reported Additional Medications   Patient reports taking the following new medications None       HPI     Depression and Anxiety Follow-Up  How are you doing with your depression since your last visit? Improved Feeling a lot better  How are you doing with your anxiety since your last visit?  Improved Feeling a lot better  Are you having other symptoms that might be associated with depression or anxiety? No  Have you had a significant life event? No   Do you have any concerns with your use of alcohol or other drugs? No    Is working on diet and exercise, getting outside.  Feels medication is helping significantly.     No significant side effects of the escitalopram.  Does have a sense that she sleeps \"hard\".  Not fatigued when she wakes up.  Has some vivid dreams. Otherwise she is quite pleased with her improvement.    Social History     Tobacco Use    Smoking status: Former     Packs/day: 0.50     Years: 37.00     Additional pack years: 0.00     Total pack years: 18.50     Types: Cigarettes     Start date:      Quit date: 2015     Years since quittin.0     Passive exposure: Past    Smokeless " tobacco: Never    Tobacco comments:     10/3/2015   Vaping Use    Vaping Use: Never used   Substance Use Topics    Alcohol use: Yes     Alcohol/week: 0.0 standard drinks of alcohol     Comment: occasional beer/wine, few drinks every other weekend    Drug use: No         2/10/2023    10:08 AM 1/3/2024     3:22 PM 1/15/2024     4:28 PM   PHQ   PHQ-9 Total Score 3 23 8   Q9: Thoughts of better off dead/self-harm past 2 weeks Not at all More than half the days Not at all   F/U: Thoughts of suicide or self-harm  Yes    F/U: Self harm-plan  No    F/U: Self-harm action  No    F/U: Safety concerns  No          7/15/2020    10:34 AM 10/21/2021     5:59 PM 1/3/2024     3:23 PM   PAMELA-7 SCORE   Total Score 6 (mild anxiety)  16 (severe anxiety)   Total Score 6 0 16   Suicide Assessment Five-step Evaluation and Treatment (SAFE-T)    How many servings of fruits and vegetables do you eat daily?  2-3  On average, how many sweetened beverages do you drink each day (Examples: soda, juice, sweet tea, etc.  Do NOT count diet or artificially sweetened beverages)?   0  How many days per week do you exercise enough to make your heart beat faster? 5  How many minutes a day do you exercise enough to make your heart beat faster? 10 - 19  How many days per week do you miss taking your medication? 0      Review of Systems   Constitutional: Negative.    Psychiatric/Behavioral:  Negative for agitation, mood changes and sleep disturbance.           Objective    Vitals:  No vitals were obtained today due to virtual visit.    Physical Exam:   General:  Health, alert and age appropriate activity  EYES: Eyes grossly normal to inspection.  No discharge or erythema, or obvious scleral/conjunctival abnormalities.  RESP: No audible wheeze, cough, or visible cyanosis.  No visible retractions or increased work of breathing.    SKIN: Visible skin clear. No significant rash, abnormal pigmentation or lesions.  PSYCH: Age-appropriate alertness and  orientation    =======================================================================================  Video-Visit Details  Ade is a 63 year old  who is being evaluated via a billable video visit.    How would you like to obtain your AVS? MyChart  If the video visit is dropped, the invitation should be resent by: Text to cell phone: [phone]  Will anyone else be joining your video visit? no  If patient encounters technical issues they should call [phone] :  Originating Location (pt. Location): Home  Distant Location (provider location):  On-site  Platform used for Video Visit: [employer]

## 2024-02-27 ENCOUNTER — PATIENT OUTREACH (OUTPATIENT)
Dept: CARE COORDINATION | Facility: CLINIC | Age: 64
End: 2024-02-27
Payer: COMMERCIAL

## 2024-03-11 ASSESSMENT — ANXIETY QUESTIONNAIRES
8. IF YOU CHECKED OFF ANY PROBLEMS, HOW DIFFICULT HAVE THESE MADE IT FOR YOU TO DO YOUR WORK, TAKE CARE OF THINGS AT HOME, OR GET ALONG WITH OTHER PEOPLE?: NOT DIFFICULT AT ALL
2. NOT BEING ABLE TO STOP OR CONTROL WORRYING: NOT AT ALL
1. FEELING NERVOUS, ANXIOUS, OR ON EDGE: NOT AT ALL
7. FEELING AFRAID AS IF SOMETHING AWFUL MIGHT HAPPEN: NOT AT ALL
3. WORRYING TOO MUCH ABOUT DIFFERENT THINGS: NOT AT ALL
GAD7 TOTAL SCORE: 0
4. TROUBLE RELAXING: NOT AT ALL
GAD7 TOTAL SCORE: 0
5. BEING SO RESTLESS THAT IT IS HARD TO SIT STILL: NOT AT ALL
6. BECOMING EASILY ANNOYED OR IRRITABLE: NOT AT ALL
7. FEELING AFRAID AS IF SOMETHING AWFUL MIGHT HAPPEN: NOT AT ALL
IF YOU CHECKED OFF ANY PROBLEMS ON THIS QUESTIONNAIRE, HOW DIFFICULT HAVE THESE PROBLEMS MADE IT FOR YOU TO DO YOUR WORK, TAKE CARE OF THINGS AT HOME, OR GET ALONG WITH OTHER PEOPLE: NOT DIFFICULT AT ALL

## 2024-03-18 ENCOUNTER — OFFICE VISIT (OUTPATIENT)
Dept: FAMILY MEDICINE | Facility: CLINIC | Age: 64
End: 2024-03-18
Attending: NURSE PRACTITIONER
Payer: COMMERCIAL

## 2024-03-18 VITALS
TEMPERATURE: 97.4 F | WEIGHT: 205.2 LBS | SYSTOLIC BLOOD PRESSURE: 128 MMHG | HEIGHT: 66 IN | RESPIRATION RATE: 15 BRPM | HEART RATE: 70 BPM | BODY MASS INDEX: 32.98 KG/M2 | OXYGEN SATURATION: 96 % | DIASTOLIC BLOOD PRESSURE: 68 MMHG

## 2024-03-18 DIAGNOSIS — F33.42 DEPRESSION, MAJOR, RECURRENT, IN COMPLETE REMISSION (H): Primary | ICD-10-CM

## 2024-03-18 DIAGNOSIS — F33.2 SEVERE EPISODE OF RECURRENT MAJOR DEPRESSIVE DISORDER, WITHOUT PSYCHOTIC FEATURES (H): ICD-10-CM

## 2024-03-18 PROCEDURE — 90678 RSV VACC PREF BIVALENT IM: CPT | Performed by: NURSE PRACTITIONER

## 2024-03-18 PROCEDURE — 96127 BRIEF EMOTIONAL/BEHAV ASSMT: CPT | Performed by: NURSE PRACTITIONER

## 2024-03-18 PROCEDURE — 99213 OFFICE O/P EST LOW 20 MIN: CPT | Mod: 25 | Performed by: NURSE PRACTITIONER

## 2024-03-18 PROCEDURE — 90471 IMMUNIZATION ADMIN: CPT | Performed by: NURSE PRACTITIONER

## 2024-03-18 RX ORDER — ESCITALOPRAM OXALATE 10 MG/1
10 TABLET ORAL DAILY
Qty: 90 TABLET | Refills: 3 | Status: SHIPPED | OUTPATIENT
Start: 2024-03-18

## 2024-03-18 ASSESSMENT — PATIENT HEALTH QUESTIONNAIRE - PHQ9: SUM OF ALL RESPONSES TO PHQ QUESTIONS 1-9: 0

## 2024-03-18 ASSESSMENT — ENCOUNTER SYMPTOMS
PSYCHIATRIC NEGATIVE: 1
CONSTITUTIONAL NEGATIVE: 1

## 2024-03-18 ASSESSMENT — PAIN SCALES - GENERAL: PAINLEVEL: NO PAIN (0)

## 2024-03-18 NOTE — PROGRESS NOTES
Assessment & Plan     ICD-10-CM    1. Severe episode of recurrent major depressive disorder, without psychotic features (H)  F33.2 escitalopram (LEXAPRO) 10 MG tablet        Refilled escitalopram for a year.  Watch for weight gain or other emerging side effects.  Follow-up for physical and recheck on depression in 6 months.      HAROLDO Mckinnon CNP  [facility] [place]  ============================================  Subjective  Severe episode of recurrent major depressive disorder, without psychotic features (H)      10/21/2021     5:59 PM 1/3/2024     3:23 PM 3/11/2024     2:43 PM   PAMELA-7 SCORE   Total Score  16 (severe anxiety) 0 (minimal anxiety)   Total Score 0 16 0         1/3/2024     3:22 PM 1/15/2024     4:28 PM 3/18/2024     4:28 PM   PHQ   PHQ-9 Total Score 23 8 0   Q9: Thoughts of better off dead/self-harm past 2 weeks More than half the days Not at all Not at all   F/U: Thoughts of suicide or self-harm Yes     F/U: Self harm-plan No     F/U: Self-harm action No     F/U: Safety concerns No       Significant improvement.  Is at remission.  Discussed long term effects of medication.  Some weight gain may be associated with SSRI therapy. She has been working to lose weight.  Patient will watch for this and we could consider use of bupropion in the future as needed.  This has worked for her in the past.  Plan follow-up in 6 months.    History of Present Illness       Mental Health Follow-up:  Patient presents to follow-up on Depression & Anxiety.Patient's depression since last visit has been:  Good  The patient is not having other symptoms associated with depression.  Patient's anxiety since last visit has been:  Good  The patient is not having other symptoms associated with anxiety.  Any significant life events: No  Patient is not feeling anxious or having panic attacks.  Patient has no concerns about alcohol or drug use.    She eats 2-3 servings of fruits and vegetables daily.She  "consumes 0 sweetened beverage(s) daily.She exercises with enough effort to increase her heart rate 10 to 19 minutes per day.  She exercises with enough effort to increase her heart rate 5 days per week.   She is taking medications regularly.    Review of Systems   Constitutional: Negative.    Psychiatric/Behavioral: Negative.       ============================================  Objective    /68   Pulse 70   Temp 97.4  F (36.3  C) (Oral)   Resp 15   Ht 1.676 m (5' 6\")   Wt 93.1 kg (205 lb 3.2 oz)   LMP 03/23/2013 (Approximate)   SpO2 96%   BMI 33.12 kg/m    Physical Exam  Constitutional:       Appearance: Normal appearance.   Cardiovascular:      Rate and Rhythm: Normal rate.   Pulmonary:      Effort: Pulmonary effort is normal.   Neurological:      Mental Status: She is alert.   Psychiatric:         Mood and Affect: Mood normal.        ============================================  HAROLDO Mckinnon CNP  Signed Electronically by: HAROLDO Mckinnon CNP        "

## 2024-03-18 NOTE — ASSESSMENT & PLAN NOTE
>>ASSESSMENT AND PLAN FOR SEVERE EPISODE OF RECURRENT MAJOR DEPRESSIVE DISORDER, WITHOUT PSYCHOTIC FEATURES (H) WRITTEN ON 3/19/2024 10:10 AM BY BORCK HERNÁNDEZ APRN CNP        10/21/2021     5:59 PM 1/3/2024     3:23 PM 3/11/2024     2:43 PM   PAMELA-7 SCORE   Total Score  16 (severe anxiety) 0 (minimal anxiety)   Total Score 0 16 0         1/3/2024     3:22 PM 1/15/2024     4:28 PM 3/18/2024     4:28 PM   PHQ   PHQ-9 Total Score 23 8 0   Q9: Thoughts of better off dead/self-harm past 2 weeks More than half the days Not at all Not at all   F/U: Thoughts of suicide or self-harm Yes     F/U: Self harm-plan No     F/U: Self-harm action No     F/U: Safety concerns No       Significant improvement.  Is at remission.  Discussed long term effects of medication.  Some weight gain may be associated with SSRI therapy. She has been working to lose weight.  Patient will watch for this and we could consider use of bupropion in the future as needed.  This has worked for her in the past.  Plan follow-up in 6 months.

## 2024-03-18 NOTE — ASSESSMENT & PLAN NOTE
10/21/2021     5:59 PM 1/3/2024     3:23 PM 3/11/2024     2:43 PM   PAMELA-7 SCORE   Total Score  16 (severe anxiety) 0 (minimal anxiety)   Total Score 0 16 0         1/3/2024     3:22 PM 1/15/2024     4:28 PM 3/18/2024     4:28 PM   PHQ   PHQ-9 Total Score 23 8 0   Q9: Thoughts of better off dead/self-harm past 2 weeks More than half the days Not at all Not at all   F/U: Thoughts of suicide or self-harm Yes     F/U: Self harm-plan No     F/U: Self-harm action No     F/U: Safety concerns No       Significant improvement.  Is at remission.  Discussed long term effects of medication.  Some weight gain may be associated with SSRI therapy. She has been working to lose weight.  Patient will watch for this and we could consider use of bupropion in the future as needed.  This has worked for her in the past.  Plan follow-up in 6 months.

## 2024-03-19 PROBLEM — F33.42 DEPRESSION, MAJOR, RECURRENT, IN COMPLETE REMISSION (H): Status: ACTIVE | Noted: 2024-03-19

## 2024-03-19 PROBLEM — F33.42 DEPRESSION, MAJOR, RECURRENT, IN COMPLETE REMISSION (H): Status: ACTIVE | Noted: 2024-01-04

## 2024-03-26 ENCOUNTER — PATIENT OUTREACH (OUTPATIENT)
Dept: CARE COORDINATION | Facility: CLINIC | Age: 64
End: 2024-03-26
Payer: COMMERCIAL

## 2024-03-29 ENCOUNTER — HOSPITAL ENCOUNTER (OUTPATIENT)
Dept: MAMMOGRAPHY | Facility: CLINIC | Age: 64
Discharge: HOME | End: 2024-03-29
Attending: INTERNAL MEDICINE | Admitting: INTERNAL MEDICINE
Payer: COMMERCIAL

## 2024-03-29 DIAGNOSIS — Z12.31 VISIT FOR SCREENING MAMMOGRAM: ICD-10-CM

## 2024-03-29 PROCEDURE — 77063 BREAST TOMOSYNTHESIS BI: CPT

## 2024-08-04 ENCOUNTER — HEALTH MAINTENANCE LETTER (OUTPATIENT)
Age: 64
End: 2024-08-04

## 2024-08-13 ENCOUNTER — PATIENT OUTREACH (OUTPATIENT)
Dept: CARE COORDINATION | Facility: CLINIC | Age: 64
End: 2024-08-13
Payer: COMMERCIAL

## 2024-09-19 ENCOUNTER — OFFICE VISIT (OUTPATIENT)
Dept: FAMILY MEDICINE | Facility: CLINIC | Age: 64
End: 2024-09-19
Attending: NURSE PRACTITIONER
Payer: COMMERCIAL

## 2024-09-19 VITALS
OXYGEN SATURATION: 96 % | DIASTOLIC BLOOD PRESSURE: 78 MMHG | TEMPERATURE: 98.5 F | WEIGHT: 212.5 LBS | HEIGHT: 66 IN | RESPIRATION RATE: 16 BRPM | SYSTOLIC BLOOD PRESSURE: 121 MMHG | HEART RATE: 67 BPM | BODY MASS INDEX: 34.15 KG/M2

## 2024-09-19 DIAGNOSIS — G89.29 CHRONIC PAIN OF BOTH KNEES: ICD-10-CM

## 2024-09-19 DIAGNOSIS — F33.42 DEPRESSION, MAJOR, RECURRENT, IN COMPLETE REMISSION (H): ICD-10-CM

## 2024-09-19 DIAGNOSIS — M25.561 CHRONIC PAIN OF BOTH KNEES: ICD-10-CM

## 2024-09-19 DIAGNOSIS — E66.09 CLASS 1 OBESITY DUE TO EXCESS CALORIES WITH SERIOUS COMORBIDITY AND BODY MASS INDEX (BMI) OF 34.0 TO 34.9 IN ADULT: Primary | ICD-10-CM

## 2024-09-19 DIAGNOSIS — E66.811 CLASS 1 OBESITY DUE TO EXCESS CALORIES WITH SERIOUS COMORBIDITY AND BODY MASS INDEX (BMI) OF 34.0 TO 34.9 IN ADULT: Primary | ICD-10-CM

## 2024-09-19 DIAGNOSIS — M25.562 CHRONIC PAIN OF BOTH KNEES: ICD-10-CM

## 2024-09-19 DIAGNOSIS — F33.2 SEVERE EPISODE OF RECURRENT MAJOR DEPRESSIVE DISORDER, WITHOUT PSYCHOTIC FEATURES (H): ICD-10-CM

## 2024-09-19 DIAGNOSIS — K21.9 GASTROESOPHAGEAL REFLUX DISEASE WITHOUT ESOPHAGITIS: ICD-10-CM

## 2024-09-19 PROCEDURE — 99214 OFFICE O/P EST MOD 30 MIN: CPT | Performed by: NURSE PRACTITIONER

## 2024-09-19 PROCEDURE — G2211 COMPLEX E/M VISIT ADD ON: HCPCS | Performed by: NURSE PRACTITIONER

## 2024-09-19 ASSESSMENT — PATIENT HEALTH QUESTIONNAIRE - PHQ9
10. IF YOU CHECKED OFF ANY PROBLEMS, HOW DIFFICULT HAVE THESE PROBLEMS MADE IT FOR YOU TO DO YOUR WORK, TAKE CARE OF THINGS AT HOME, OR GET ALONG WITH OTHER PEOPLE: NOT DIFFICULT AT ALL
SUM OF ALL RESPONSES TO PHQ QUESTIONS 1-9: 5
SUM OF ALL RESPONSES TO PHQ QUESTIONS 1-9: 5

## 2024-09-19 ASSESSMENT — ENCOUNTER SYMPTOMS
UNEXPECTED WEIGHT CHANGE: 0
CONSTIPATION: 0
PALPITATIONS: 0
FEVER: 0
SHORTNESS OF BREATH: 0
ABDOMINAL PAIN: 0
CHILLS: 0
DIARRHEA: 0
DYSURIA: 0
CHEST TIGHTNESS: 0

## 2024-09-19 ASSESSMENT — PAIN SCALES - GENERAL: PAINLEVEL: NO PAIN (0)

## 2024-09-19 NOTE — PATIENT INSTRUCTIONS
Patient Education   Preventive Care Advice   This is general advice given by our system to help you stay healthy. However, your care team may have specific advice just for you. Please talk to your care team about your preventive care needs.  Nutrition  Eat 5 or more servings of fruits and vegetables each day.  Try wheat bread, brown rice and whole grain pasta (instead of white bread, rice, and pasta).  Get enough calcium and vitamin D. Check the label on foods and aim for 100% of the RDA (recommended daily allowance).  Lifestyle  Exercise at least 150 minutes each week  (30 minutes a day, 5 days a week).  Do muscle strengthening activities 2 days a week. These help control your weight and prevent disease.  No smoking.  Wear sunscreen to prevent skin cancer.  Have a dental exam and cleaning every 6 months.  Yearly exams  See your health care team every year to talk about:  Any changes in your health.  Any medicines your care team has prescribed.  Preventive care, family planning, and ways to prevent chronic diseases.  Shots (vaccines)   HPV shots (up to age 26), if you've never had them before.  Hepatitis B shots (up to age 59), if you've never had them before.  COVID-19 shot: Get this shot when it's due.  Flu shot: Get a flu shot every year.  Tetanus shot: Get a tetanus shot every 10 years.  Pneumococcal, hepatitis A, and RSV shots: Ask your care team if you need these based on your risk.  Shingles shot (for age 50 and up)  General health tests  Diabetes screening:  Starting at age 35, Get screened for diabetes at least every 3 years.  If you are younger than age 35, ask your care team if you should be screened for diabetes.  Cholesterol test: At age 39, start having a cholesterol test every 5 years, or more often if advised.  Bone density scan (DEXA): At age 50, ask your care team if you should have this scan for osteoporosis (brittle bones).  Hepatitis C: Get tested at least once in your life.  STIs (sexually  transmitted infections)  Before age 24: Ask your care team if you should be screened for STIs.  After age 24: Get screened for STIs if you're at risk. You are at risk for STIs (including HIV) if:  You are sexually active with more than one person.  You don't use condoms every time.  You or a partner was diagnosed with a sexually transmitted infection.  If you are at risk for HIV, ask about PrEP medicine to prevent HIV.  Get tested for HIV at least once in your life, whether you are at risk for HIV or not.  Cancer screening tests  Cervical cancer screening: If you have a cervix, begin getting regular cervical cancer screening tests starting at age 21.  Breast cancer scan (mammogram): If you've ever had breasts, begin having regular mammograms starting at age 40. This is a scan to check for breast cancer.  Colon cancer screening: It is important to start screening for colon cancer at age 45.  Have a colonoscopy test every 10 years (or more often if you're at risk) Or, ask your provider about stool tests like a FIT test every year or Cologuard test every 3 years.  To learn more about your testing options, visit:   .  For help making a decision, visit:   https://bit.ly/dv42310.  Prostate cancer screening test: If you have a prostate, ask your care team if a prostate cancer screening test (PSA) at age 55 is right for you.  Lung cancer screening: If you are a current or former smoker ages 50 to 80, ask your care team if ongoing lung cancer screenings are right for you.  For informational purposes only. Not to replace the advice of your health care provider. Copyright   2023 [employer]. All rights reserved. Clinically reviewed by the [facility]. GetNotes [id number] - REV 01/24.

## 2024-09-19 NOTE — PROGRESS NOTES
Assessment and Plan  Class 1 obesity due to excess calories with serious comorbidity and body mass index (BMI) of 34.0 to 34.9 in adult   Desires medication management for weight loss.  Risks and benefits were reviewed.  Potential side effects reviewed.    Follow up in one month for recheck  - tirzepatide-Weight Management (ZEPBOUND) 2.5 MG/0.5ML prefilled pen  Dispense: 2 mL; Refill: 0    Gastroesophageal reflux disease without esophagitis   Currently controlled.  Watch for worsening with zepbound.    Chronic pain of both knees   Limiting activity and worsening with weight gain.    Severe episode of recurrent major depressive disorder, without psychotic features (H)   Currently at remission.    Depression, major, recurrent, in complete remission (H24)   In remission, but side effects of escitalopram include weight gain.  Desires medication management for weight loss.        HAROLDO Mckinnon [facility] [place]    ============================================  Subjective   Ade Crowley is a 64 year old female   here for a Physical Exam    Depression, major, recurrent, in complete remission (H24)  Feels that the Escitalopram at 10 mg/day has been very helpful to control her depression.  However she feels she has had weight gain.  Wt Readings from Last 4 Encounters:   09/19/24 96.4 kg (212 lb 8 oz)   03/18/24 93.1 kg (205 lb 3.2 oz)   06/29/23 93.3 kg (205 lb 9.6 oz)   06/16/23 93.9 kg (207 lb)     Does not wish to stop the Escitalopram at this time.    Class 1 obesity due to excess calories with serious comorbidity and body mass index (BMI) of 34.0 to 34.9 in adult  BMI is 34.30.  Patient continues to gain weight on Escitalopram.  Desires to use medication for weight loss.  Comorbid conditions include GERD as well as bilateral knee pain which is limiting activity.  No family history or personal history of multiple endocrine neoplasia or pancreatitis.  Risks and benefits of medications were  reviewed with patient.  Plan tirzepatide 2.5 mg weekly and follow-up in 1 month in clinic.    Health Care Directive  Discussed advance care planning with patient; information given to patient to review.  Patient Care Team:  Юлия Mayorga APRN CNP as PCP - General (Family Practice)  Sophia Jiménez DO as Assigned OBGYN Provider  Юлия Mayorga APRN CNP as Assigned PCP          2/10/2023   Nutrition   Three or more servings of calcium each day? No   Diet: regular (no restrictions)            2/10/2023   Exercise   Frequency of exercise: 1 day/week              1/3/2024   Social Factors   Worry food won't last until get money to buy more No   Food not last or not have enough money for food? No   Do you have housing? (Housing is defined as stable permanent housing and does not include staying ouside in a car, in a tent, in an abandoned building, in an overnight shelter, or couch-surfing.) Yes   Are you worried about losing your housing? No   Lack of transportation? No   Unable to get utilities (heat,electricity)? No            9/19/2024   Fall Risk   Fallen 2 or more times in the past year? No   Trouble with walking or balance? No             2/10/2023   Dental   Dentist two times every year? Yes               Today's PHQ-9 Score:       9/19/2024    12:56 PM   PHQ-9 SCORE   PHQ-9 Total Score MyChart 5 (Mild depression)   PHQ-9 Total Score 5             2/10/2023   Substance Use   Alcohol more than 3/day or more than 7/wk Yes   How often do you have a drink containing alcohol 2 to 3 times a week   How many alcohol drinks on typical day 1 or 2   How often do you have 5+ drinks at one occasion Never   Audit 2/3 Score 0   Have you or someone else been injured because of your drinking No   Has anyone been concerned or suggested you cut down on drinking No   TOTAL SCORE - AUDIT Incomplete          Social History     Tobacco Use    Smoking status: Former     Current packs/day: 0.00     Average packs/day: 0.5  packs/day for 37.0 years (18.5 ttl pk-yrs)     Types: Cigarettes     Start date:      Quit date:      Years since quittin.7     Passive exposure: Past    Smokeless tobacco: Never    Tobacco comments:     10/3/2015   Vaping Use    Vaping status: Never Used   Substance Use Topics    Alcohol use: Yes     Alcohol/week: 0.0 standard drinks of alcohol     Comment: occasional beer/wine, few drinks every other weekend    Drug use: No             3/29/2024   LAST FHS-7 RESULTS   1st degree relative breast or ovarian cancer Yes   Any relative bilateral breast cancer No   Any male have breast cancer No   Any ONE woman have BOTH breast AND ovarian cancer No   Any woman with breast cancer before 50yrs No   2 or more relatives with breast AND/OR ovarian cancer No   2 or more relatives with breast AND/OR bowel cancer No        History of abnormal Pap smear:         Latest Ref Rng & Units 2021     3:07 PM 2021     2:45 PM 2016    11:40 AM   PAP / HPV   PAP (Historical)  NIL      HPV 16 DNA NEG^Negative  Negative  Negative    HPV 18 DNA NEG^Negative  Negative  Negative    Other HR HPV NEG^Negative  Negative  Negative      ASCVD Risk   The 10-year ASCVD risk score (Koby HAMM, et al., 2019) is: 4.7%    Values used to calculate the score:      Age: 64 years      Sex: Female      Is Non- : No      Diabetic: No      Tobacco smoker: No      Systolic Blood Pressure: 121 mmHg      Is BP treated: No      HDL Cholesterol: 50 mg/dL      Total Cholesterol: 231 mg/dL           The Following Health Maintenance Topics are reviewed and are correct as of today:  Health Maintenance   Topic Date Due    LUNG CANCER SCREENING  Never done    INFLUENZA VACCINE (1) 2024    COVID-19 Vaccine ( season) 2024    ANNUAL REVIEW OF HM ORDERS  01/15/2025    PHQ-9  2025    MAMMO SCREENING  2025    YEARLY PREVENTIVE VISIT  2025    GLUCOSE  2026    HPV TEST   "04/22/2026    PAP  04/22/2026    COLORECTAL CANCER SCREENING  06/16/2026    LIPID  02/10/2028    DTAP/TDAP/TD IMMUNIZATION (3 - Td or Tdap) 05/10/2028    ADVANCE CARE PLANNING  09/19/2029    HEPATITIS C SCREENING  Completed    HIV SCREENING  Completed    DEPRESSION ACTION PLAN  Completed    ZOSTER IMMUNIZATION  Completed    Pneumococcal Vaccine: Pediatrics (0 to 5 Years) and At-Risk Patients (6 to 64 Years)  Aged Out    HPV IMMUNIZATION  Aged Out    MENINGITIS IMMUNIZATION  Aged Out    RSV MONOCLONAL ANTIBODY  Aged Out     Reviewed and updated as needed this visit by Provider   Tobacco  Allergies  Meds  Problems  Med Hx  Surg Hx  Fam Hx         HPI  Review of Systems   Constitutional:  Negative for chills, fever and unexpected weight change.   HENT: Negative.     Respiratory:  Negative for chest tightness and shortness of breath.    Cardiovascular:  Negative for chest pain and palpitations.   Gastrointestinal:  Negative for abdominal pain, constipation and diarrhea.   Genitourinary:  Negative for dysuria.   Skin:  Negative for rash.     ============================================  Objective    Exam  /78 (BP Location: Right arm, Patient Position: Sitting, Cuff Size: Adult Large)   Pulse 67   Temp 98.5  F (36.9  C) (Oral)   Resp 16   Ht 1.676 m (5' 6\")   Wt 96.4 kg (212 lb 8 oz)   LMP 03/23/2013 (Approximate)   SpO2 96%   BMI 34.30 kg/m    Physical Exam  Constitutional:       Appearance: Normal appearance.   Cardiovascular:      Rate and Rhythm: Normal rate.   Pulmonary:      Effort: Pulmonary effort is normal.   Neurological:      Mental Status: She is alert.   Psychiatric:         Mood and Affect: Mood normal.           10/21/2021     5:59 PM 1/3/2024     3:23 PM 3/11/2024     2:43 PM   PAMELA-7 SCORE   Total Score  16 (severe anxiety) 0 (minimal anxiety)   Total Score 0 16 0         1/15/2024     4:28 PM 3/18/2024     4:28 PM 9/19/2024    12:56 PM   PHQ   PHQ-9 Total Score 8 0 5   Q9: Thoughts of " better off dead/self-harm past 2 weeks Not at all Not at all Not at all

## 2024-09-20 ENCOUNTER — TELEPHONE (OUTPATIENT)
Dept: FAMILY MEDICINE | Facility: CLINIC | Age: 64
End: 2024-09-20
Payer: COMMERCIAL

## 2024-09-20 PROBLEM — E66.811 CLASS 1 OBESITY DUE TO EXCESS CALORIES WITH SERIOUS COMORBIDITY AND BODY MASS INDEX (BMI) OF 34.0 TO 34.9 IN ADULT: Status: ACTIVE | Noted: 2024-09-20

## 2024-09-20 PROBLEM — E66.09 CLASS 1 OBESITY DUE TO EXCESS CALORIES WITH SERIOUS COMORBIDITY AND BODY MASS INDEX (BMI) OF 34.0 TO 34.9 IN ADULT: Status: ACTIVE | Noted: 2024-09-20

## 2024-09-20 NOTE — TELEPHONE ENCOUNTER
PRIOR AUTHORIZATION DENIED    Medication: TIRZEPATIDE-WEIGHT MANAGEMENT 2.5 MG/0.5ML SC SOAJ  Insurance Company: [employer] [place] - Phone [phone] Fax [phone]  Denial Date: 9/19/2024  Denial Reason(s):     Appeal Information:     Patient Notified: No

## 2024-09-21 NOTE — ASSESSMENT & PLAN NOTE
BMI is 34.30.  Patient continues to gain weight on Escitalopram.  Desires to use medication for weight loss.  Comorbid conditions include GERD as well as bilateral knee pain which is limiting activity.  No family history or personal history of multiple endocrine neoplasia or pancreatitis.  Risks and benefits of medications were reviewed with patient.  Plan tirzepatide 2.5 mg weekly and follow-up in 1 month in clinic.

## 2024-09-21 NOTE — ASSESSMENT & PLAN NOTE
Feels that the Escitalopram at 10 mg/day has been very helpful to control her depression.  However she feels she has had weight gain.  Wt Readings from Last 4 Encounters:   09/19/24 96.4 kg (212 lb 8 oz)   03/18/24 93.1 kg (205 lb 3.2 oz)   06/29/23 93.3 kg (205 lb 9.6 oz)   06/16/23 93.9 kg (207 lb)     Does not wish to stop the Escitalopram at this time.

## 2024-09-26 ENCOUNTER — OFFICE VISIT (OUTPATIENT)
Dept: OBGYN | Facility: CLINIC | Age: 64
End: 2024-09-26
Payer: COMMERCIAL

## 2024-09-26 VITALS
DIASTOLIC BLOOD PRESSURE: 60 MMHG | SYSTOLIC BLOOD PRESSURE: 104 MMHG | HEIGHT: 66 IN | BODY MASS INDEX: 34.15 KG/M2 | WEIGHT: 212.5 LBS

## 2024-09-26 DIAGNOSIS — R63.5 WEIGHT GAIN: ICD-10-CM

## 2024-09-26 DIAGNOSIS — Z13.9 SCREENING FOR CONDITION: ICD-10-CM

## 2024-09-26 DIAGNOSIS — F43.22 ADJUSTMENT DISORDER WITH ANXIOUS MOOD: Primary | ICD-10-CM

## 2024-09-26 LAB
CHOLEST SERPL-MCNC: 229 MG/DL
ERYTHROCYTE [DISTWIDTH] IN BLOOD BY AUTOMATED COUNT: 12.5 % (ref 10–15)
FASTING STATUS PATIENT QL REPORTED: YES
HCT VFR BLD AUTO: 41.2 % (ref 35–47)
HDLC SERPL-MCNC: 47 MG/DL
HGB BLD-MCNC: 14.2 G/DL (ref 11.7–15.7)
LDLC SERPL CALC-MCNC: 138 MG/DL
MCH RBC QN AUTO: 30.9 PG (ref 26.5–33)
MCHC RBC AUTO-ENTMCNC: 34.5 G/DL (ref 31.5–36.5)
MCV RBC AUTO: 90 FL (ref 78–100)
NONHDLC SERPL-MCNC: 182 MG/DL
PLATELET # BLD AUTO: 272 10E3/UL (ref 150–450)
RBC # BLD AUTO: 4.6 10E6/UL (ref 3.8–5.2)
TRIGL SERPL-MCNC: 218 MG/DL
WBC # BLD AUTO: 6.5 10E3/UL (ref 4–11)

## 2024-09-26 PROCEDURE — 99396 PREV VISIT EST AGE 40-64: CPT | Performed by: FAMILY MEDICINE

## 2024-09-26 PROCEDURE — 36415 COLL VENOUS BLD VENIPUNCTURE: CPT | Performed by: FAMILY MEDICINE

## 2024-09-26 PROCEDURE — 85027 COMPLETE CBC AUTOMATED: CPT | Performed by: FAMILY MEDICINE

## 2024-09-26 PROCEDURE — 99213 OFFICE O/P EST LOW 20 MIN: CPT | Mod: 25 | Performed by: FAMILY MEDICINE

## 2024-09-26 PROCEDURE — 80061 LIPID PANEL: CPT | Performed by: FAMILY MEDICINE

## 2024-09-26 RX ORDER — BUPROPION HYDROCHLORIDE 150 MG/1
150 TABLET ORAL EVERY MORNING
Qty: 90 TABLET | Refills: 3 | Status: SHIPPED | OUTPATIENT
Start: 2024-09-26

## 2024-09-26 RX ORDER — TOPIRAMATE 25 MG/1
25 TABLET, FILM COATED ORAL 2 TIMES DAILY
Qty: 180 TABLET | Refills: 3 | Status: SHIPPED | OUTPATIENT
Start: 2024-09-26

## 2024-09-26 NOTE — PROGRESS NOTES
SUBJECTIVE:  Ade Crowley is an 64 year old  postmenopausal woman   who presents for annual gyn exam. Menopause at age 54. No   bleeding, spotting, or discharge noted. Concerns:  none    Estrogen replacement therapy: never  NILA exposure: no  History of abnormal Pap smear: No, due   Family history of uterine or ovarian cancer: No  Regular self breast exam: Yes  History of abnormal mammogram: No  Family history of breast cancer: Yes: Mother age  age 77, had cancer since age 60  History of abnormal lipids: No  MGM:  Kidney or bone cancer     Past Medical History:   Diagnosis Date    Anesthesia complication     pt states with one of her C-sections the epidural didn't work and she needed general anesthesia    Depression     Infrequent menses     & heavy menses    SVT (supraventricular tachycardia) (H24)     S/p ablation 12/10/15    WPW (Jaret-Parkinson-White syndrome) 1997          Family History   Problem Relation Age of Onset    Arthritis Mother     Cancer Mother         breast cancer stage 4 original [id number] recurred 2 years ago/skin cancer bcc    Gynecology Mother         uterine tumor with hysterectomy    Thyroid Disease Mother         hypothyroid    Breast Cancer Mother         60's    Cardiovascular Father         chf  of heart attack age 68    Diabetes Daughter         type one diabetes   diagnosed age 9    Cancer Maternal Grandmother         bone cancer    Colon Cancer No family hx of        Past Surgical History:   Procedure Laterality Date    APPENDECTOMY       SECTION  x3    COLONOSCOPY N/A 2014    Procedure: COMBINED COLONOSCOPY, SINGLE OR MULTIPLE BIOPSY/POLYPECTOMY BY BIOPSY;  Surgeon: Jesus Adler MD;  Location:  GI    COLONOSCOPY N/A 2023    Procedure: Colonoscopy polypectomies with snare and polypectomy with forceps;  Surgeon: Jesus Olmos MD;  Location:  GI    COLONOSCOPY N/A 2023    Procedure: COLONOSCOPY, WITH POLYPECTOMY AND  BIOPSY;  Surgeon: Jesus Olmos MD;  Location: [facility]    DAVINCI HYSTERECTOMY SUPRACERVICAL  2013    Procedure: DAVINCI HYSTERECTOMY SUPRACERVICAL;  Frozen section dilation & curretage. Robotic Assisted  Laparoscopic Supracervical Hysterectomy, Bilateral Salpingectomy, Cystoscopy ;  Surgeon: Sophia Jiménez DO;  Location: [facility] OR    DILATION AND CURETTAGE  2013    Procedure: DILATION AND CURETTAGE;;  Surgeon: Sophia Jiménez DO;  Location: [facility] OR    H ABLATION SVT  12/10/2015    WPW    HC REMOVAL OF TONSILS,12+ Y/O      [facility] NONSPECIFIC PROCEDURE      Tubal ligation    [facility] NONSPECIFIC PROCEDURE      C-sections x 3    [facility] NONSPECIFIC PROCEDURE      left breast biopsy--benign       Current Outpatient Medications   Medication Sig Dispense Refill    escitalopram (LEXAPRO) 10 MG tablet Take 1 tablet (10 mg) by mouth daily 90 tablet 3    tirzepatide-Weight Management (ZEPBOUND) 2.5 MG/0.5ML prefilled pen Inject 0.5 mLs (2.5 mg) subcutaneously every 7 days. 2 mL 0     No current facility-administered medications for this visit.     Allergies   Allergen Reactions    No Known Drug Allergy        Social History     Tobacco Use    Smoking status: Former     Current packs/day: 0.00     Average packs/day: 0.5 packs/day for 37.0 years (18.5 ttl pk-yrs)     Types: Cigarettes     Start date:      Quit date:      Years since quittin.7     Passive exposure: Past    Smokeless tobacco: Never    Tobacco comments:     10/3/2015   Substance Use Topics    Alcohol use: Yes     Alcohol/week: 0.0 standard drinks of alcohol     Comment: occasional beer/wine, few drinks every other weekend       Review Of Systems  Ears/Nose/Throat: negative  Respiratory: No shortness of breath, dyspnea on exertion, cough, or hemoptysis  Cardiovascular: negative  Gastrointestinal: negative  Genitourinary: See HPI   Constitutional, HEENT, cardiovascular, pulmonary, GI, , musculoskeletal, neuro, skin, endocrine and psych  "systems are negative, except as otherwise noted.    OBJECTIVE:  /60   Ht 1.676 m (5' 6\")   Wt 96.4 kg (212 lb 8 oz)   LMP 2013 (Approximate)   BMI 34.30 kg/m    General appearance: healthy, alert and no distress  Skin: Skin color, texture, turgor normal. No rashes or lesions.  Ears: negative  Nose/Sinuses: Nares normal. Septum midline. Mucosa normal. No drainage or sinus tenderness.  Oropharynx: Lips, mucosa, and tongue normal. Teeth and gums normal.  Neck: Neck supple. No adenopathy. Thyroid symmetric, normal size,, Carotids without bruits.  Lungs: negative, Percussion normal. Good diaphragmatic excursion. Lungs clear  Heart: negative, PMI normal. No lifts, heaves, or thrills. RRR. No murmurs, clicks gallops or rub  Breasts: Inspection negative. No nipple discharge or bleeding. No masses.  Abdomen: Abdomen soft, non-tender. BS normal. No masses, organomegaly  Pelvic: Pelvic:  Pelvic examination with no pap/no Gonorrhea and Chlamydia   including  External genitalia normal   and vagina normal rugatted not atrophic  Examination of urethra  normal no masses, tenderness, scarring  bladder, no masses or tenderness  Cervix no lesions or discharge  Bimanual exam with   uterus surgically absent   Adnexa/parametria        ASSESSMENT:  Ade Crowley is an 64 year old  postmenopausal woman   who presents for annual gyn exam.     PLAN:  Dx:  1)  Pap smear due  due to supracervical hysterectomy, mammogram completed , Colonoscopy due    2)  Lipids at appropriate intervals  3)  Covid-19 concerns: covid infection and vaccination recommendations discussed.   4)  Class 1 obesity:  rx zepbound   $1200 per month, not sure if starting. Might do a formulary exception  Discussed topamax 25 mg po BID can be done, if primary not able to prescribe.    5)  Anxiety: Lexapro and weight gain: gained 12-15# in 10 months.  Would like to consider welbutrin.    And change to this.  Stop lexapro, rx welbutrin " given.  She has tolerated welbutrin previously.   6)  hot flushes:  topamax can often help decrease       PE:  Reviewed health maintenance including diet, regular exercise,   estrogen replacement and periodic exams.    Dr. Sophia Jiménez, DO    Obstetrics and Gynecology  [facility] - [place] and [place]

## 2024-09-26 NOTE — PATIENT INSTRUCTIONS
Labs today   Yearly exams     Dr. Sophia Jiménez, DO    Obstetrics and Gynecology  [facility] - [place] and [place]

## 2024-09-26 NOTE — NURSING NOTE
"Chief Complaint   Patient presents with    Gyn Exam       Initial /60   Ht 1.676 m (5' 6\")   Wt 96.4 kg (212 lb 8 oz)   LMP 2013 (Approximate)   BMI 34.30 kg/m   Estimated body mass index is 34.3 kg/m  as calculated from the following:    Height as of this encounter: 1.676 m (5' 6\").    Weight as of this encounter: 96.4 kg (212 lb 8 oz).  BP completed using cuff size: regular long    Questioned patient about current smoking habits.  Pt. quit smoking some time ago.          The following HM Due: NONE    "

## 2025-02-27 ENCOUNTER — PATIENT OUTREACH (OUTPATIENT)
Dept: CARE COORDINATION | Facility: CLINIC | Age: 65
End: 2025-02-27
Payer: COMMERCIAL

## 2025-04-04 ENCOUNTER — HOSPITAL ENCOUNTER (OUTPATIENT)
Dept: MAMMOGRAPHY | Facility: CLINIC | Age: 65
Discharge: HOME | End: 2025-04-04
Attending: NURSE PRACTITIONER | Admitting: NURSE PRACTITIONER
Payer: COMMERCIAL

## 2025-04-04 DIAGNOSIS — Z12.31 VISIT FOR SCREENING MAMMOGRAM: ICD-10-CM

## 2025-04-04 PROCEDURE — 77063 BREAST TOMOSYNTHESIS BI: CPT

## 2025-04-04 PROCEDURE — 77067 SCR MAMMO BI INCL CAD: CPT

## 2025-06-02 ENCOUNTER — ANCILLARY PROCEDURE (OUTPATIENT)
Dept: GENERAL RADIOLOGY | Facility: CLINIC | Age: 65
End: 2025-06-02
Attending: NURSE PRACTITIONER
Payer: COMMERCIAL

## 2025-06-02 ENCOUNTER — OFFICE VISIT (OUTPATIENT)
Dept: FAMILY MEDICINE | Facility: CLINIC | Age: 65
End: 2025-06-02
Payer: COMMERCIAL

## 2025-06-02 VITALS
DIASTOLIC BLOOD PRESSURE: 84 MMHG | SYSTOLIC BLOOD PRESSURE: 126 MMHG | BODY MASS INDEX: 32.08 KG/M2 | HEART RATE: 71 BPM | HEIGHT: 66 IN | OXYGEN SATURATION: 97 % | RESPIRATION RATE: 18 BRPM | WEIGHT: 199.6 LBS | TEMPERATURE: 98.2 F

## 2025-06-02 DIAGNOSIS — M79.671 RIGHT FOOT PAIN: ICD-10-CM

## 2025-06-02 DIAGNOSIS — M25.561 ACUTE PAIN OF RIGHT KNEE: Primary | ICD-10-CM

## 2025-06-02 DIAGNOSIS — M25.561 ACUTE PAIN OF RIGHT KNEE: ICD-10-CM

## 2025-06-02 PROCEDURE — 3079F DIAST BP 80-89 MM HG: CPT | Performed by: NURSE PRACTITIONER

## 2025-06-02 PROCEDURE — 3074F SYST BP LT 130 MM HG: CPT | Performed by: NURSE PRACTITIONER

## 2025-06-02 PROCEDURE — 73562 X-RAY EXAM OF KNEE 3: CPT | Mod: TC | Performed by: RADIOLOGY

## 2025-06-02 PROCEDURE — 99214 OFFICE O/P EST MOD 30 MIN: CPT | Performed by: NURSE PRACTITIONER

## 2025-06-02 PROCEDURE — 1125F AMNT PAIN NOTED PAIN PRSNT: CPT | Performed by: NURSE PRACTITIONER

## 2025-06-02 ASSESSMENT — PAIN SCALES - GENERAL: PAINLEVEL_OUTOF10: MODERATE PAIN (4)

## 2025-06-02 ASSESSMENT — PATIENT HEALTH QUESTIONNAIRE - PHQ9
10. IF YOU CHECKED OFF ANY PROBLEMS, HOW DIFFICULT HAVE THESE PROBLEMS MADE IT FOR YOU TO DO YOUR WORK, TAKE CARE OF THINGS AT HOME, OR GET ALONG WITH OTHER PEOPLE: NOT DIFFICULT AT ALL
SUM OF ALL RESPONSES TO PHQ QUESTIONS 1-9: 0
SUM OF ALL RESPONSES TO PHQ QUESTIONS 1-9: 0

## 2025-06-02 NOTE — PROGRESS NOTES
Assessment & Plan   Acute pain of right knee   Patellofemoral degenerative changes  - XR Knee Right 3 Views  - Orthopedic  Referral    Right foot pain   Refer to podiatry for further evaluation of foot pain that is affecting gait  - Orthopedic  Referral      There are no Patient Instructions on file for this visit.  HAROLDO Mckinnon CNP  [facility] [place]  ============================================  Subjective  Right knee pain  Knee pain with onset about 8 weeks ago, No recent or remote history of injury to explain symptoms.   Thinks it may be related to altering her gait to accommodate a painful bump 5th metatarsal head right foot. Pain is from mid calf and into posterior and lateral knee.  No redness, swelling or increased warmth.  No other painful joints.  No fevers or chills.  No swelling of calf.  No tenderness of calf and no increased edema of ankle or foot.      Musculoskeletal Problem  Pertinent negatives include no chest pain, coughing or joint swelling.   History of Present Illness       Reason for visit:  Lingering leg pain  Symptom onset:  More than a month  Symptoms include:  Burning aching pain in my right leg, mostly behind the knee area.  Symptom intensity:  Moderate  Symptom progression:  Staying the same  Had these symptoms before:  No  What makes it worse:  Movement  What makes it better:  Feels better in the mornig ang gts worse as the day goes on   She is taking medications regularly.    Reviewed by Provider at this visit   Tobacco  Allergies  Meds  Problems  Med Hx  Surg Hx  Fam Hx         Review of Systems   Constitutional: Negative.    Respiratory: Negative.  Negative for cough and shortness of breath.    Cardiovascular:  Negative for chest pain.   Musculoskeletal:  Positive for gait problem. Negative for joint swelling.   Psychiatric/Behavioral: Negative.       ============================================  Objective    /84 (BP Location:  "Right arm, Patient Position: Sitting, Cuff Size: Adult Regular)   Pulse 71   Temp 98.2  F (36.8  C) (Oral)   Resp 18   Ht 1.676 m (5' 6\")   Wt 90.5 kg (199 lb 9.6 oz)   LMP 03/23/2013 (Approximate)   SpO2 97%   BMI 32.22 kg/m    Physical Exam  Constitutional:       General: She is not in acute distress.     Appearance: Normal appearance.   Cardiovascular:      Rate and Rhythm: Normal rate and regular rhythm.      Pulses: Normal pulses.      Heart sounds: Normal heart sounds.   Pulmonary:      Effort: Pulmonary effort is normal.      Breath sounds: Normal breath sounds.   Musculoskeletal:         General: Swelling present. No tenderness.      Right lower leg: No edema.      Left lower leg: No edema.      Comments: Right knee reveals full range of motion, no joint line tenderness, masses, minimal effusion is noted on right--no ligamentous instability. Pain is over posterior knee and less so diffusely lateral knee extending down into upper calf.  Negative sakina's sign.  No redness or swelling of calf.  Gait shows patient if favoring right foot due to discomfort.    X-ray with some patello femoral degenerative changes        Skin:     General: Skin is warm and dry.      Findings: No rash.   Neurological:      Mental Status: She is alert.   Psychiatric:         Mood and Affect: Mood normal.        ============================================  HAROLDO Mckinnon CNP  Signed Electronically by: HAROLDO Mckinnon CNP            "

## 2025-06-03 ENCOUNTER — PATIENT OUTREACH (OUTPATIENT)
Dept: CARE COORDINATION | Facility: CLINIC | Age: 65
End: 2025-06-03
Payer: COMMERCIAL

## 2025-06-03 ASSESSMENT — ENCOUNTER SYMPTOMS
CONSTITUTIONAL NEGATIVE: 1
RESPIRATORY NEGATIVE: 1
COUGH: 0
PSYCHIATRIC NEGATIVE: 1
JOINT SWELLING: 0
SHORTNESS OF BREATH: 0

## 2025-06-03 NOTE — ASSESSMENT & PLAN NOTE
Knee pain with onset about 8 weeks ago, No recent or remote history of injury to explain symptoms.   Thinks it may be related to altering her gait to accommodate a painful bump 5th metatarsal head right foot. Pain is from mid calf and into posterior and lateral knee.  No redness, swelling or increased warmth.  No other painful joints.  No fevers or chills.  No swelling of calf.  No tenderness of calf and no increased edema of ankle or foot.  Also noted significant pain if kneeling on knees to do activities.

## 2025-06-05 ENCOUNTER — PATIENT OUTREACH (OUTPATIENT)
Dept: CARE COORDINATION | Facility: CLINIC | Age: 65
End: 2025-06-05
Payer: COMMERCIAL

## 2025-06-05 NOTE — PROGRESS NOTES
ASSESSMENT & PLAN  Patient Instructions     1. Primary osteoarthritis of both knees    2. Acute pain of right knee    3. Sciatica, right side      -Patient has acute on on chronic bilateral knee pain due to arthritis and right leg pain due to sciatica  -Personal review of right knee x-ray performed recently shows mild degenerative changes of the patellofemoral joints bilaterally  -Patient will start formal physical therapy and home exercise program to strengthen and stabilize bilateral legs and low back  -Patient may take over-the-counter pain medications as needed  -Patient will continue to follow-up with me for further treatment and recommendations  -Call direct clinic number [[phone]] at any time with questions or concerns.    Albert Yeo MD [facility]  [facility]        -----    SUBJECTIVE  Ade Crowley is a/an 64 year old female who is seen in consultation at the request of  Юлия Mayorga C.N.P. for evaluation of bilateral knee. The patient was recently seen in Family Practice on 6/2/25 for right knee pain. At that time, the plan was right knee XR and referral to orthopedics for further management. Patient states that the pain started in the right knee in the posterior aspect. The patient is seen by themselves.    Onset: 2-3 month(s) ago. Reports insidious onset without acute precipitating event.  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     Past Medical History:   Diagnosis Date    Anesthesia complication     pt states with one of her C-sections  the epidural didn't work and she needed general anesthesia    Depression     Depressive disorder     Infrequent menses     & heavy menses    SVT (supraventricular tachycardia)     S/p ablation 12/10/15    WPW (Jaret-Parkinson-White syndrome) 02/18/1997     Social History     Socioeconomic History    Marital status:    Tobacco Use    Smoking status: Former     Current packs/day: 0.00     Average packs/day: 0.5 packs/day for 37.0 years (18.5 ttl pk-yrs)     Types: Cigarettes     Start date: 1978     Quit date: 2015     Years since quitting: 10.4     Passive exposure: Past    Smokeless tobacco: Never    Tobacco comments:     10/3/2015   Vaping Use    Vaping status: Never Used   Substance and Sexual Activity    Alcohol use: Yes     Alcohol/week: 0.0 standard drinks of alcohol     Comment: occasional beer/wine, few drinks every other weekend    Drug use: No    Sexual activity: Yes     Partners: Male     Birth control/protection: Surgical     Comment: tubal ligation   Other Topics Concern    Parent/sibling w/ CABG, MI or angioplasty before 65F 55M? No    Caffeine Concern Yes     Comment: 4-5 cups coffee per day    Sleep Concern No    Weight Concern Yes     Comment: trying to lose weight    Special Diet Yes     Comment: weight watchers    Exercise Yes     Comment: gym 3 days week     Social Drivers of Health     Financial Resource Strain: Low Risk  (9/19/2024)    Financial Resource Strain     Within the past 12 months, have you or your family members you live with been unable to get utilities (heat, electricity) when it was really needed?: No   Food Insecurity: Low Risk  (9/19/2024)    Food Insecurity     Within the past 12 months, did you worry that your food would run out before you got money to buy more?: No     Within the past 12 months, did the food you bought just not last and you didn t have money to get more?: No   Transportation Needs: Low Risk  (9/19/2024)    Transportation Needs     Within the past 12  months, has lack of transportation kept you from medical appointments, getting your medicines, non-medical meetings or appointments, work, or from getting things that you need?: No   Physical Activity: Insufficiently Active (9/19/2024)    Exercise Vital Sign     Days of Exercise per Week: 3 days     Minutes of Exercise per Session: 20 min   Stress: Stress Concern Present (9/19/2024)    [employer] - Occupational Stress Questionnaire     Feeling of Stress : To some extent   Social Connections: Unknown (9/19/2024)    Social Connection and Isolation Panel [NHANES]     Frequency of Social Gatherings with Friends and Family: Three times a week   Interpersonal Safety: Low Risk  (6/2/2025)    Interpersonal Safety     Do you feel physically and emotionally safe where you currently live?: Yes     Within the past 12 months, have you been hit, slapped, kicked or otherwise physically hurt by someone?: No     Within the past 12 months, have you been humiliated or emotionally abused in other ways by your partner or ex-partner?: No   Housing Stability: Low Risk  (9/19/2024)    Housing Stability     Do you have housing? : Yes     Are you worried about losing your housing?: No         Patient's past medical, surgical, social, and family histories were reviewed today and no changes are noted.    REVIEW OF SYSTEMS:  10 point ROS is negative other than symptoms noted above in HPI, Past Medical History or as stated below  Constitutional: NEGATIVE for fever, chills, change in weight  Skin: NEGATIVE for worrisome rashes, moles or lesions  GI/: NEGATIVE for bowel or bladder changes  Neuro: NEGATIVE for weakness, dizziness or paresthesias    OBJECTIVE:  LMP 03/23/2013 (Approximate)    General: healthy, alert and in no distress  HEENT: no scleral icterus or conjunctival erythema  Skin: no suspicious lesions or rash. No jaundice.  CV: no pedal edema  Resp: normal respiratory effort without conversational dyspnea    Psych: normal mood and affect  Gait: normal steady gait with appropriate coordination and balance  Neuro: Normal light sensory exam of lower extremity  MSK:  BILATERAL KNEE  Inspection:    normal alignment  Palpation:    Tender about the lateral patellar facet, lateral joint line, and medial joint line. Remainder of bony and ligamentous landmarks are nontender.    Mild effusion is present    Patellofemoral crepitus is Present  Range of Motion:     00 extension to 1200 flexion  Strength:    Quadriceps grossly intact    Extensor mechanism intact  Special Tests:    Positive: none    Negative: MCL/valgus stress (0 & 30 deg), LCL/varus stress (0 & 30 deg), Lachman's, anterior drawer, posterior drawer, Yovanny's    Independent visualization of the below image:  No results found for this or any previous visit (from the past 24 hours).    Personal review of right knee x-ray performed on 6/2/2025 shows mild degenerative changes of bilateral patellofemoral joints.  Mild right joint effusion.  No acute fracture or dislocation.    Albert Yeo MD [facility]

## 2025-06-06 ENCOUNTER — RESULTS FOLLOW-UP (OUTPATIENT)
Dept: FAMILY MEDICINE | Facility: CLINIC | Age: 65
End: 2025-06-06

## 2025-06-10 ENCOUNTER — OFFICE VISIT (OUTPATIENT)
Dept: ORTHOPEDICS | Facility: CLINIC | Age: 65
End: 2025-06-10
Attending: NURSE PRACTITIONER
Payer: COMMERCIAL

## 2025-06-10 DIAGNOSIS — M54.31 SCIATICA, RIGHT SIDE: ICD-10-CM

## 2025-06-10 DIAGNOSIS — M17.0 PRIMARY OSTEOARTHRITIS OF BOTH KNEES: Primary | ICD-10-CM

## 2025-06-10 DIAGNOSIS — M25.561 ACUTE PAIN OF RIGHT KNEE: ICD-10-CM

## 2025-06-10 PROCEDURE — 99244 OFF/OP CNSLTJ NEW/EST MOD 40: CPT | Performed by: FAMILY MEDICINE

## 2025-06-10 SDOH — HEALTH STABILITY: PHYSICAL HEALTH: ON AVERAGE, HOW MANY DAYS PER WEEK DO YOU ENGAGE IN MODERATE TO STRENUOUS EXERCISE (LIKE A BRISK WALK)?: 2 DAYS

## 2025-06-10 SDOH — HEALTH STABILITY: PHYSICAL HEALTH: ON AVERAGE, HOW MANY MINUTES DO YOU ENGAGE IN EXERCISE AT THIS LEVEL?: 20 MIN

## 2025-06-10 NOTE — PATIENT INSTRUCTIONS
1. Primary osteoarthritis of both knees    2. Acute pain of right knee    3. Sciatica, right side      -Patient has acute on on chronic bilateral knee pain due to arthritis and right leg pain due to sciatica  -Personal review of right knee x-ray performed recently shows mild degenerative changes of the patellofemoral joints bilaterally  -Patient will start formal physical therapy and home exercise program to strengthen and stabilize bilateral legs and low back  -Patient may take over-the-counter pain medications as needed  -Patient will continue to follow-up with me for further treatment and recommendations  -Call direct clinic number [[phone]] at any time with questions or concerns.    Albert Yeo MD CAM  [facility]  [facility] [place]

## 2025-06-10 NOTE — LETTER
6/10/2025      Ade Crowley  [id number] [address] [id number]      Dear Colleague,    Thank you for referring your patient, Ade Crowley, to the [facility] [place]. Please see a copy of my visit note below.    ASSESSMENT & PLAN  Patient Instructions     1. Primary osteoarthritis of both knees    2. Acute pain of right knee    3. Sciatica, right side      -Patient has acute on on chronic bilateral knee pain due to arthritis and right leg pain due to sciatica  -Personal review of right knee x-ray performed recently shows mild degenerative changes of the patellofemoral joints bilaterally  -Patient will start formal physical therapy and home exercise program to strengthen and stabilize bilateral legs and low back  -Patient may take over-the-counter pain medications as needed  -Patient will continue to follow-up with me for further treatment and recommendations  -Call direct clinic number [[phone]] at any time with questions or concerns.    Albert Yeo MD [facility]  [facility]        -----    SUBJECTIVE  Ade Crowley is a/an 64 year old female who is seen in consultation at the request of  Юлия Mayorga C.N.P. for evaluation of bilateral knee. The patient was recently seen in Family Practice on 6/2/25 for right knee pain. At that time, the plan was right knee XR and referral to orthopedics for further management. Patient states that the pain started in the right knee in the posterior aspect. The patient is seen by themselves.    Onset: 2-3 month(s) ago. Reports insidious onset without acute precipitating event.  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     Past Medical History:   Diagnosis Date     Anesthesia complication     pt states with one of her C-sections the epidural didn't work and she needed general anesthesia     Depression      Depressive disorder      Infrequent menses     & heavy menses     SVT (supraventricular tachycardia)     S/p ablation 12/10/15     WPW (Jaret-Parkinson-White syndrome) 02/18/1997     Social History     Socioeconomic History     Marital status:    Tobacco Use     Smoking status: Former     Current packs/day: 0.00     Average packs/day: 0.5 packs/day for 37.0 years (18.5 ttl pk-yrs)     Types: Cigarettes     Start date: 1978     Quit date: 2015     Years since quitting: 10.4     Passive exposure: Past     Smokeless tobacco: Never     Tobacco comments:     10/3/2015   Vaping Use     Vaping status: Never Used   Substance and Sexual Activity     Alcohol use: Yes     Alcohol/week: 0.0 standard drinks of alcohol     Comment: occasional beer/wine, few drinks every other weekend     Drug use: No     Sexual activity: Yes     Partners: Male     Birth control/protection: Surgical     Comment: tubal ligation   Other Topics Concern     Parent/sibling w/ CABG, MI or angioplasty before 65F 55M? No     Caffeine Concern Yes     Comment: 4-5 cups coffee per day     Sleep Concern No     Weight Concern Yes     Comment: trying to lose weight     Special Diet Yes     Comment: weight watchers     Exercise Yes     Comment: gym 3 days week     Social Drivers of Health     Financial Resource Strain: Low Risk  (9/19/2024)    Financial Resource Strain      Within the past 12 months, have you or your family members you live with been unable to get utilities (heat, electricity) when it was really needed?: No   Food Insecurity: Low Risk  (9/19/2024)    Food Insecurity      Within the past 12 months, did you  worry that your food would run out before you got money to buy more?: No      Within the past 12 months, did the food you bought just not last and you didn t have money to get more?: No   Transportation Needs: Low Risk  (9/19/2024)    Transportation Needs      Within the past 12 months, has lack of transportation kept you from medical appointments, getting your medicines, non-medical meetings or appointments, work, or from getting things that you need?: No   Physical Activity: Insufficiently Active (9/19/2024)    Exercise Vital Sign      Days of Exercise per Week: 3 days      Minutes of Exercise per Session: 20 min   Stress: Stress Concern Present (9/19/2024)    [employer] [place] [employer] - Occupational Stress Questionnaire      Feeling of Stress : To some extent   Social Connections: Unknown (9/19/2024)    Social Connection and Isolation Panel [NHANES]      Frequency of Social Gatherings with Friends and Family: Three times a week   Interpersonal Safety: Low Risk  (6/2/2025)    Interpersonal Safety      Do you feel physically and emotionally safe where you currently live?: Yes      Within the past 12 months, have you been hit, slapped, kicked or otherwise physically hurt by someone?: No      Within the past 12 months, have you been humiliated or emotionally abused in other ways by your partner or ex-partner?: No   Housing Stability: Low Risk  (9/19/2024)    Housing Stability      Do you have housing? : Yes      Are you worried about losing your housing?: No         Patient's past medical, surgical, social, and family histories were reviewed today and no changes are noted.    REVIEW OF SYSTEMS:  10 point ROS is negative other than symptoms noted above in HPI, Past Medical History or as stated below  Constitutional: NEGATIVE for fever, chills, change in weight  Skin: NEGATIVE for worrisome rashes, moles or lesions  GI/: NEGATIVE for bowel or bladder changes  Neuro: NEGATIVE for weakness, dizziness or  paresthesias    OBJECTIVE:  LMP 03/23/2013 (Approximate)    General: healthy, alert and in no distress  HEENT: no scleral icterus or conjunctival erythema  Skin: no suspicious lesions or rash. No jaundice.  CV: no pedal edema  Resp: normal respiratory effort without conversational dyspnea   Psych: normal mood and affect  Gait: normal steady gait with appropriate coordination and balance  Neuro: Normal light sensory exam of lower extremity  MSK:  BILATERAL KNEE  Inspection:    normal alignment  Palpation:    Tender about the lateral patellar facet, lateral joint line, and medial joint line. Remainder of bony and ligamentous landmarks are nontender.    Mild effusion is present    Patellofemoral crepitus is Present  Range of Motion:     00 extension to 1200 flexion  Strength:    Quadriceps grossly intact    Extensor mechanism intact  Special Tests:    Positive: none    Negative: MCL/valgus stress (0 & 30 deg), LCL/varus stress (0 & 30 deg), Lachman's, anterior drawer, posterior drawer, Yovanny's    Independent visualization of the below image:  No results found for this or any previous visit (from the past 24 hours).    Personal review of right knee x-ray performed on 6/2/2025 shows mild degenerative changes of bilateral patellofemoral joints.  Mild right joint effusion.  No acute fracture or dislocation.    Albert Yeo MD [facility]      Again, thank you for allowing me to participate in the care of your patient.        Sincerely,        Albert Yeo, MD    Electronically signed

## 2025-07-08 ASSESSMENT — ACTIVITIES OF DAILY LIVING (ADL)
SQUAT: ACTIVITY IS FAIRLY DIFFICULT
HOW_WOULD_YOU_RATE_THE_CURRENT_FUNCTION_OF_YOUR_KNEE_DURING_YOUR_USUAL_DAILY_ACTIVITIES_ON_A_SCALE_FROM_0_TO_100_WITH_100_BEING_YOUR_LEVEL_OF_KNEE_FUNCTION_PRIOR_TO_YOUR_INJURY_AND_0_BEING_THE_INABILITY_TO_PERFORM_ANY_OF_YOUR_USUAL_DAILY_ACTIVITIES?: 70
STIFFNESS: THE SYMPTOM AFFECTS MY ACTIVITY MODERATELY
HOW_WOULD_YOU_RATE_THE_OVERALL_FUNCTION_OF_YOUR_KNEE_DURING_YOUR_USUAL_DAILY_ACTIVITIES?: NEARLY NORMAL
RISE FROM A CHAIR: ACTIVITY IS SOMEWHAT DIFFICULT
WALK: ACTIVITY IS MINIMALLY DIFFICULT
HOW_WOULD_YOU_RATE_THE_CURRENT_FUNCTION_OF_YOUR_KNEE_DURING_YOUR_USUAL_DAILY_ACTIVITIES_ON_A_SCALE_FROM_0_TO_100_WITH_100_BEING_YOUR_LEVEL_OF_KNEE_FUNCTION_PRIOR_TO_YOUR_INJURY_AND_0_BEING_THE_INABILITY_TO_PERFORM_ANY_OF_YOUR_USUAL_DAILY_ACTIVITIES?: 70
STIFFNESS: THE SYMPTOM AFFECTS MY ACTIVITY MODERATELY
LIMPING: THE SYMPTOM AFFECTS MY ACTIVITY SEVERELY
GO UP STAIRS: ACTIVITY IS FAIRLY DIFFICULT
WALK: ACTIVITY IS MINIMALLY DIFFICULT
STAND: ACTIVITY IS NOT DIFFICULT
GIVING WAY, BUCKLING OR SHIFTING OF KNEE: THE SYMPTOM AFFECTS MY ACTIVITY MODERATELY
STAND: ACTIVITY IS NOT DIFFICULT
AS_A_RESULT_OF_YOUR_KNEE_INJURY,_HOW_WOULD_YOU_RATE_YOUR_CURRENT_LEVEL_OF_DAILY_ACTIVITY?: NEARLY NORMAL
KNEEL ON THE FRONT OF YOUR KNEE: ACTIVITY IS VERY DIFFICULT
PAIN: THE SYMPTOM AFFECTS MY ACTIVITY MODERATELY
KNEEL ON THE FRONT OF YOUR KNEE: ACTIVITY IS VERY DIFFICULT
SIT WITH YOUR KNEE BENT: ACTIVITY IS SOMEWHAT DIFFICULT
SIT WITH YOUR KNEE BENT: ACTIVITY IS SOMEWHAT DIFFICULT
RISE FROM A CHAIR: ACTIVITY IS SOMEWHAT DIFFICULT
HOW_WOULD_YOU_RATE_THE_OVERALL_FUNCTION_OF_YOUR_KNEE_DURING_YOUR_USUAL_DAILY_ACTIVITIES?: NEARLY NORMAL
WEAKNESS: THE SYMPTOM PREVENTS ME FROM ALL DAILY ACTIVITIES
GO DOWN STAIRS: ACTIVITY IS FAIRLY DIFFICULT
SWELLING: THE SYMPTOM AFFECTS MY ACTIVITY SLIGHTLY
LIMPING: THE SYMPTOM AFFECTS MY ACTIVITY SEVERELY
SWELLING: THE SYMPTOM AFFECTS MY ACTIVITY SLIGHTLY
GIVING WAY, BUCKLING OR SHIFTING OF KNEE: THE SYMPTOM AFFECTS MY ACTIVITY MODERATELY
WEAKNESS: THE SYMPTOM PREVENTS ME FROM ALL DAILY ACTIVITIES
KNEE_ACTIVITY_OF_DAILY_LIVING_SUM: 32
GO DOWN STAIRS: ACTIVITY IS FAIRLY DIFFICULT
PLEASE_INDICATE_YOR_PRIMARY_REASON_FOR_REFERRAL_TO_THERAPY:: KNEE
SQUAT: ACTIVITY IS FAIRLY DIFFICULT
GO UP STAIRS: ACTIVITY IS FAIRLY DIFFICULT
AS_A_RESULT_OF_YOUR_KNEE_INJURY,_HOW_WOULD_YOU_RATE_YOUR_CURRENT_LEVEL_OF_DAILY_ACTIVITY?: NEARLY NORMAL
RAW_SCORE: 32
PAIN: THE SYMPTOM AFFECTS MY ACTIVITY MODERATELY
KNEE_ACTIVITY_OF_DAILY_LIVING_SCORE: 45.71

## 2025-07-10 ENCOUNTER — THERAPY VISIT (OUTPATIENT)
Dept: PHYSICAL THERAPY | Facility: CLINIC | Age: 65
End: 2025-07-10
Attending: FAMILY MEDICINE
Payer: COMMERCIAL

## 2025-07-10 DIAGNOSIS — M54.31 SCIATICA, RIGHT SIDE: ICD-10-CM

## 2025-07-10 DIAGNOSIS — M17.0 PRIMARY OSTEOARTHRITIS OF BOTH KNEES: ICD-10-CM

## 2025-07-10 DIAGNOSIS — M25.561 ACUTE PAIN OF RIGHT KNEE: ICD-10-CM

## 2025-07-10 NOTE — PROGRESS NOTES
"PHYSICAL THERAPY EVALUATION  Type of Visit: Evaluation     Fall Risk Screen:  Have you fallen 2 or more times in the past year?: No  Have you fallen and had an injury in the past year?: No  Is patient receiving Physical Therapy Services?: Yes    Subjective   Patient is a 65 y/o female who presents with B knee pain and R LE pain that has been present for years but sx have worsened since March 2025 after a trip to [place] with her grandchildren where there was lots of walking..  Mechanism/History of injury/symptoms: insidious onset with no specific ANNETTE. Patient s chief complaints: B knee pain and R LE pain.  Symptoms are exacerbated by sit<>stand, stairs, walking initially, then it gets better.  Symptoms are relieved by  hot tub makes knees feel better, resting. Current function restrictions:  pain limited movement especially transitional movements, length of walking, and stairs.  Previous functional status as reported by patient: nearly unlimited. Notes intermittent R foot tingling.    Patient denies red flags including: bowel/bladder changes, drop foot, or uncontrollable weakness in LE. Patient denies sudden weight loss, change in appetite, or night pain that does not improve/change with repositioning.         Presenting condition or subjective complaint: \"Primarily my right knee has intense pain when bending.  Stairs are difficult.  The pain can be in the back of my knee and/or radiate down my calf.\"  Date of onset: 07/10/25    Relevant medical history:     Past Medical History:   Diagnosis Date    Anesthesia complication     pt states with one of her C-sections the epidural didn't work and she needed general anesthesia    Depression     Depressive disorder     Infrequent menses     & heavy menses    SVT (supraventricular tachycardia)     S/p ablation 12/10/15    WPW (Jaret-Parkinson-White syndrome) 02/18/1997     Dates & types of surgery: tonsils and appendectomy (1970's), 3 C-sections (1988, 1989, 1994), Ablation " (2015)    Prior diagnostic imaging/testing results: X-ray     Prior therapy history for the same diagnosis, illness or injury: No      Prior Level of Function: Independent    Living Environment  Social support: With a significant other or spouse   Type of home: House; 2-story   Stairs to enter the home: Yes 6 Is there a railing: Yes     Ramp: No   Stairs inside the home: Yes 16 Is there a railing: Yes     Help at home: None  Equipment owned:       Employment: Yes  at [employer]- sitting all day (hybrid)  Hobbies/Interests: gardening,  hiking, crocheting    Patient goals for therapy: Kneel, get up/down, go up/down stairs without pain.  I'd like to strengthen both knees.    Pain assessment: Pain present- R LE and B knee pain     Objective   KNEE EVALUATION  POSTURE: WFL in standing; mild forward rounded posture when sitting, improved with lumbar roll correction  GAIT:  Assistive Device(s): None  Gait Deviations: Antalgic  BALANCE/PROPRIOCEPTION: B SL balance impaired <30 seconds mostly pain limited  ROM: AROM WFL- painful R>L especially with flexion and end range extension  STRENGTH:   Pain: - none + mild ++ moderate +++ severe  Strength Scale: 0-5/5 Left Right   Hip Flexion 4+ 4   Hip Extension 4 4   Hip Abduction 4 4-   Hip Adduction     Hip Internal Rotation     Hip External Rotation     Knee Flexion 5- 4+   Knee Extension 5- 4     FLEXIBILITY: WFL  FUNCTIONAL TESTS: pain with sit<>stand and DL squat mostly in R LE  PALPATION: TTP B posterior knee and along R posterior calf and hamstring  JOINT MOBILITY: patellar mobility WNL    Assessment & Plan   CLINICAL IMPRESSIONS  Medical Diagnosis: Primary osteoarthritis of both knees (M17.0); Acute on on chronic bilateral knee pain due to arthritis and right leg pain due to sciatica    Treatment Diagnosis: B knee pain R>L side and LBP with radiating sx   Impression/Assessment: Patient is a 64 year old female with B knee pain and R LE radiating symptoms  complaints.  The following significant findings have been identified: Pain, Decreased ROM/flexibility, Impaired balance, Impaired gait, Impaired muscle performance, Decreased activity tolerance, and Impaired posture. These impairments interfere with their ability to perform self care tasks, work tasks, recreational activities, household chores, driving , household mobility, and community mobility as compared to previous level of function.     Clinical Decision Making (Complexity):  Clinical Presentation: Stable/Uncomplicated  Clinical Presentation Rationale: based on medical and personal factors listed in PT evaluation  Clinical Decision Making (Complexity): Low complexity    PLAN OF CARE  Treatment Interventions:  Interventions: Gait Training, Manual Therapy, Neuromuscular Re-education, Therapeutic Activity, Therapeutic Exercise, Self-Care/Home Management    Long Term Goals     PT Goal 1  Goal Identifier: HEP  Goal Description: Patient will be consistent and independent with HEP at least 6x per week in order to achieve functional goals.  Rationale: to maximize safety and independence with performance of ADLs and functional tasks;to maximize safety and independence within the home;to maximize safety and independence within the community;to maximize safety and independence with self cares  Target Date: 08/07/25  PT Goal 2  Goal Identifier: Stairs  Goal Description: Patient will tolerate ascending and descending full flight of stairs without railing and using reciprocal pattern in order to improve her community access and strengthen LE.  Rationale: to maximize safety and independence within the home;to maximize safety and independence within the community;to maximize safety and independence with transportation  Target Date: 09/04/25  PT Goal 3  Goal Identifier: Walking  Goal Description: Patient will tolerate walking at least 2 miles during her work day without increased R LE radiating symptoms to improve her overall  riley tolerance and health.  Rationale: to maximize safety and independence within the home;to maximize safety and independence within the community;to maximize safety and independence with transportation  Target Date: 09/18/25      Frequency of Treatment: 1x per week for 6 weeks then every other week for 4 weeks  Duration of Treatment: 10 weeks    Recommended Referrals to Other Professionals:   Education Assessment:   Learner/Method: Patient;No Barriers to Learning  Education Comments: no concerns    Risks and benefits of evaluation/treatment have been explained.   Patient/Family/caregiver agrees with Plan of Care.     Evaluation Time:     PT Eval, Low Complexity Minutes (09759): 15     Signing Clinician: Elaine Nuno PT

## 2025-08-27 ENCOUNTER — PATIENT OUTREACH (OUTPATIENT)
Dept: CARE COORDINATION | Facility: CLINIC | Age: 65
End: 2025-08-27
Payer: COMMERCIAL

## (undated) DEVICE — ENDO FORCEP ENDOJAW BIOPSY 2.8MMX230CM FB-220U

## (undated) DEVICE — ENDO SNARE POLYPECTOMY OVAL 15MM LOOP SD-240U-15

## (undated) DEVICE — KIT ENDO TURNOVER/PROCEDURE W/CLEAN A SCOPE LINERS 103888

## (undated) RX ORDER — FENTANYL CITRATE 50 UG/ML
INJECTION, SOLUTION INTRAMUSCULAR; INTRAVENOUS
Status: DISPENSED
Start: 2023-06-16